# Patient Record
Sex: MALE | Race: BLACK OR AFRICAN AMERICAN | NOT HISPANIC OR LATINO | Employment: OTHER | URBAN - METROPOLITAN AREA
[De-identification: names, ages, dates, MRNs, and addresses within clinical notes are randomized per-mention and may not be internally consistent; named-entity substitution may affect disease eponyms.]

---

## 2023-06-02 ENCOUNTER — LAB (OUTPATIENT)
Dept: LAB | Facility: LAB | Age: 78
End: 2023-06-02
Payer: MEDICARE

## 2023-06-02 ENCOUNTER — OFFICE VISIT (OUTPATIENT)
Dept: PRIMARY CARE | Facility: CLINIC | Age: 78
End: 2023-06-02
Payer: MEDICARE

## 2023-06-02 VITALS
OXYGEN SATURATION: 98 % | HEART RATE: 57 BPM | DIASTOLIC BLOOD PRESSURE: 84 MMHG | SYSTOLIC BLOOD PRESSURE: 138 MMHG | HEIGHT: 71 IN | WEIGHT: 210.5 LBS | RESPIRATION RATE: 12 BRPM | BODY MASS INDEX: 29.47 KG/M2 | TEMPERATURE: 97.1 F

## 2023-06-02 DIAGNOSIS — Z13.89 ENCOUNTER FOR SCREENING FOR OTHER DISORDER: ICD-10-CM

## 2023-06-02 DIAGNOSIS — E66.3 OVERWEIGHT WITH BODY MASS INDEX (BMI) OF 29 TO 29.9 IN ADULT: ICD-10-CM

## 2023-06-02 DIAGNOSIS — Z12.12 SCREENING FOR COLORECTAL CANCER: ICD-10-CM

## 2023-06-02 DIAGNOSIS — Z00.00 MEDICARE ANNUAL WELLNESS VISIT, SUBSEQUENT: Primary | ICD-10-CM

## 2023-06-02 DIAGNOSIS — Z12.11 SCREENING FOR COLORECTAL CANCER: ICD-10-CM

## 2023-06-02 DIAGNOSIS — L84 CORN OF FOOT: ICD-10-CM

## 2023-06-02 DIAGNOSIS — Z23 NEED FOR DIPHTHERIA-TETANUS-PERTUSSIS (TDAP) VACCINE: ICD-10-CM

## 2023-06-02 PROBLEM — F02.80 ALZHEIMER DISEASE (MULTI): Status: ACTIVE | Noted: 2023-06-02

## 2023-06-02 PROBLEM — G47.33 OSA (OBSTRUCTIVE SLEEP APNEA): Status: ACTIVE | Noted: 2023-06-02

## 2023-06-02 PROBLEM — M25.562 ARTHRALGIA OF LEFT KNEE: Status: ACTIVE | Noted: 2023-06-02

## 2023-06-02 PROBLEM — M62.81 MUSCLE WEAKNESS (GENERALIZED): Status: ACTIVE | Noted: 2023-06-02

## 2023-06-02 PROBLEM — M54.32 BILATERAL SCIATICA: Status: ACTIVE | Noted: 2023-06-02

## 2023-06-02 PROBLEM — S86.812A: Status: ACTIVE | Noted: 2023-06-02

## 2023-06-02 PROBLEM — M19.019 DEGENERATIVE JOINT DISEASE OF SHOULDER REGION: Status: ACTIVE | Noted: 2023-06-02

## 2023-06-02 PROBLEM — D12.6 TUBULOVILLOUS ADENOMA POLYP OF COLON: Status: ACTIVE | Noted: 2023-06-02

## 2023-06-02 PROBLEM — R41.3 MEMORY IMPAIRMENT: Status: ACTIVE | Noted: 2023-06-02

## 2023-06-02 PROBLEM — R41.89 IMPAIRED COGNITION: Status: ACTIVE | Noted: 2023-06-02

## 2023-06-02 PROBLEM — M77.8 RIGHT SHOULDER TENDINITIS: Status: ACTIVE | Noted: 2023-06-02

## 2023-06-02 PROBLEM — G30.9 ALZHEIMER DISEASE (MULTI): Status: ACTIVE | Noted: 2023-06-02

## 2023-06-02 PROBLEM — E78.5 HYPERLIPEMIA: Status: ACTIVE | Noted: 2023-06-02

## 2023-06-02 PROBLEM — M25.569 KNEE PAIN: Status: ACTIVE | Noted: 2023-06-02

## 2023-06-02 PROBLEM — F17.211 CIGARETTE NICOTINE DEPENDENCE IN REMISSION: Status: ACTIVE | Noted: 2023-06-02

## 2023-06-02 PROBLEM — R26.2 DIFFICULTY WALKING: Status: ACTIVE | Noted: 2023-06-02

## 2023-06-02 PROBLEM — M54.16 ACUTE LUMBAR RADICULOPATHY: Status: ACTIVE | Noted: 2023-06-02

## 2023-06-02 PROBLEM — M20.40 HAMMER TOE: Status: ACTIVE | Noted: 2023-06-02

## 2023-06-02 PROBLEM — G47.30 APNEA, SLEEP: Status: ACTIVE | Noted: 2023-06-02

## 2023-06-02 PROBLEM — G89.29 CHRONIC RIGHT SHOULDER PAIN: Status: ACTIVE | Noted: 2023-06-02

## 2023-06-02 PROBLEM — G31.84 MCI (MILD COGNITIVE IMPAIRMENT): Status: ACTIVE | Noted: 2023-06-02

## 2023-06-02 PROBLEM — L30.9 ECZEMA: Status: ACTIVE | Noted: 2023-06-02

## 2023-06-02 PROBLEM — M20.62 TOE DEFORMITY, LEFT: Status: ACTIVE | Noted: 2023-06-02

## 2023-06-02 PROBLEM — R00.1 BRADYCARDIA: Status: ACTIVE | Noted: 2023-06-02

## 2023-06-02 PROBLEM — C61 MALIGNANT NEOPLASM OF PROSTATE (MULTI): Status: ACTIVE | Noted: 2023-06-02

## 2023-06-02 PROBLEM — S76.112D: Status: ACTIVE | Noted: 2023-06-02

## 2023-06-02 PROBLEM — M25.69 STIFF BACK: Status: ACTIVE | Noted: 2023-06-02

## 2023-06-02 PROBLEM — M47.27 OSTEOARTHRITIS OF SPINE WITH RADICULOPATHY, LUMBOSACRAL REGION: Status: ACTIVE | Noted: 2023-06-02

## 2023-06-02 PROBLEM — S86.812D PATELLAR TENDON RUPTURE, LEFT, SUBSEQUENT ENCOUNTER: Status: ACTIVE | Noted: 2023-06-02

## 2023-06-02 PROBLEM — Q74.2 TOE ANOMALY: Status: ACTIVE | Noted: 2023-06-02

## 2023-06-02 PROBLEM — M25.511 CHRONIC RIGHT SHOULDER PAIN: Status: ACTIVE | Noted: 2023-06-02

## 2023-06-02 PROBLEM — B07.9 VIRAL WART, UNSPECIFIED: Status: ACTIVE | Noted: 2023-06-02

## 2023-06-02 PROBLEM — M54.31 BILATERAL SCIATICA: Status: ACTIVE | Noted: 2023-06-02

## 2023-06-02 LAB
ALANINE AMINOTRANSFERASE (SGPT) (U/L) IN SER/PLAS: 24 U/L (ref 10–52)
ALBUMIN (G/DL) IN SER/PLAS: 4.2 G/DL (ref 3.4–5)
ALKALINE PHOSPHATASE (U/L) IN SER/PLAS: 81 U/L (ref 33–136)
ANION GAP IN SER/PLAS: 10 MMOL/L (ref 10–20)
ASPARTATE AMINOTRANSFERASE (SGOT) (U/L) IN SER/PLAS: 23 U/L (ref 9–39)
BASOPHILS (10*3/UL) IN BLOOD BY AUTOMATED COUNT: 0.05 X10E9/L (ref 0–0.1)
BASOPHILS/100 LEUKOCYTES IN BLOOD BY AUTOMATED COUNT: 1.3 % (ref 0–2)
BILIRUBIN TOTAL (MG/DL) IN SER/PLAS: 0.4 MG/DL (ref 0–1.2)
CALCIUM (MG/DL) IN SER/PLAS: 10.1 MG/DL (ref 8.6–10.6)
CARBON DIOXIDE, TOTAL (MMOL/L) IN SER/PLAS: 27 MMOL/L (ref 21–32)
CHLORIDE (MMOL/L) IN SER/PLAS: 109 MMOL/L (ref 98–107)
CHOLESTEROL (MG/DL) IN SER/PLAS: 174 MG/DL (ref 0–199)
CHOLESTEROL IN HDL (MG/DL) IN SER/PLAS: 52 MG/DL
CHOLESTEROL/HDL RATIO: 3.3
CREATININE (MG/DL) IN SER/PLAS: 1.04 MG/DL (ref 0.5–1.3)
EOSINOPHILS (10*3/UL) IN BLOOD BY AUTOMATED COUNT: 0.2 X10E9/L (ref 0–0.4)
EOSINOPHILS/100 LEUKOCYTES IN BLOOD BY AUTOMATED COUNT: 5.1 % (ref 0–6)
ERYTHROCYTE DISTRIBUTION WIDTH (RATIO) BY AUTOMATED COUNT: 12.7 % (ref 11.5–14.5)
ERYTHROCYTE MEAN CORPUSCULAR HEMOGLOBIN CONCENTRATION (G/DL) BY AUTOMATED: 32 G/DL (ref 32–36)
ERYTHROCYTE MEAN CORPUSCULAR VOLUME (FL) BY AUTOMATED COUNT: 99 FL (ref 80–100)
ERYTHROCYTES (10*6/UL) IN BLOOD BY AUTOMATED COUNT: 4.32 X10E12/L (ref 4.5–5.9)
GFR MALE: 74 ML/MIN/1.73M2
GLUCOSE (MG/DL) IN SER/PLAS: 80 MG/DL (ref 74–99)
HEMATOCRIT (%) IN BLOOD BY AUTOMATED COUNT: 42.8 % (ref 41–52)
HEMOGLOBIN (G/DL) IN BLOOD: 13.7 G/DL (ref 13.5–17.5)
IMMATURE GRANULOCYTES/100 LEUKOCYTES IN BLOOD BY AUTOMATED COUNT: 0.3 % (ref 0–0.9)
LDL: 105 MG/DL (ref 0–99)
LEUKOCYTES (10*3/UL) IN BLOOD BY AUTOMATED COUNT: 3.9 X10E9/L (ref 4.4–11.3)
LYMPHOCYTES (10*3/UL) IN BLOOD BY AUTOMATED COUNT: 1.59 X10E9/L (ref 0.8–3)
LYMPHOCYTES/100 LEUKOCYTES IN BLOOD BY AUTOMATED COUNT: 40.4 % (ref 13–44)
MONOCYTES (10*3/UL) IN BLOOD BY AUTOMATED COUNT: 0.46 X10E9/L (ref 0.05–0.8)
MONOCYTES/100 LEUKOCYTES IN BLOOD BY AUTOMATED COUNT: 11.7 % (ref 2–10)
NEUTROPHILS (10*3/UL) IN BLOOD BY AUTOMATED COUNT: 1.63 X10E9/L (ref 1.6–5.5)
NEUTROPHILS/100 LEUKOCYTES IN BLOOD BY AUTOMATED COUNT: 41.2 % (ref 40–80)
NRBC (PER 100 WBCS) BY AUTOMATED COUNT: 0 /100 WBC (ref 0–0)
PLATELETS (10*3/UL) IN BLOOD AUTOMATED COUNT: 214 X10E9/L (ref 150–450)
POTASSIUM (MMOL/L) IN SER/PLAS: 4.1 MMOL/L (ref 3.5–5.3)
PROTEIN TOTAL: 6.9 G/DL (ref 6.4–8.2)
SODIUM (MMOL/L) IN SER/PLAS: 142 MMOL/L (ref 136–145)
TRIGLYCERIDE (MG/DL) IN SER/PLAS: 83 MG/DL (ref 0–149)
UREA NITROGEN (MG/DL) IN SER/PLAS: 22 MG/DL (ref 6–23)
VLDL: 17 MG/DL (ref 0–40)

## 2023-06-02 PROCEDURE — 36415 COLL VENOUS BLD VENIPUNCTURE: CPT

## 2023-06-02 PROCEDURE — 1036F TOBACCO NON-USER: CPT | Performed by: STUDENT IN AN ORGANIZED HEALTH CARE EDUCATION/TRAINING PROGRAM

## 2023-06-02 PROCEDURE — 90715 TDAP VACCINE 7 YRS/> IM: CPT | Performed by: STUDENT IN AN ORGANIZED HEALTH CARE EDUCATION/TRAINING PROGRAM

## 2023-06-02 PROCEDURE — 90471 IMMUNIZATION ADMIN: CPT | Performed by: STUDENT IN AN ORGANIZED HEALTH CARE EDUCATION/TRAINING PROGRAM

## 2023-06-02 PROCEDURE — 80053 COMPREHEN METABOLIC PANEL: CPT

## 2023-06-02 PROCEDURE — G0444 DEPRESSION SCREEN ANNUAL: HCPCS | Performed by: STUDENT IN AN ORGANIZED HEALTH CARE EDUCATION/TRAINING PROGRAM

## 2023-06-02 PROCEDURE — 1170F FXNL STATUS ASSESSED: CPT | Performed by: STUDENT IN AN ORGANIZED HEALTH CARE EDUCATION/TRAINING PROGRAM

## 2023-06-02 PROCEDURE — 80061 LIPID PANEL: CPT

## 2023-06-02 PROCEDURE — G0439 PPPS, SUBSEQ VISIT: HCPCS | Performed by: STUDENT IN AN ORGANIZED HEALTH CARE EDUCATION/TRAINING PROGRAM

## 2023-06-02 PROCEDURE — 1159F MED LIST DOCD IN RCRD: CPT | Performed by: STUDENT IN AN ORGANIZED HEALTH CARE EDUCATION/TRAINING PROGRAM

## 2023-06-02 PROCEDURE — 99397 PER PM REEVAL EST PAT 65+ YR: CPT | Performed by: STUDENT IN AN ORGANIZED HEALTH CARE EDUCATION/TRAINING PROGRAM

## 2023-06-02 PROCEDURE — 85025 COMPLETE CBC W/AUTO DIFF WBC: CPT

## 2023-06-02 PROCEDURE — 1160F RVW MEDS BY RX/DR IN RCRD: CPT | Performed by: STUDENT IN AN ORGANIZED HEALTH CARE EDUCATION/TRAINING PROGRAM

## 2023-06-02 RX ORDER — TRIAMCINOLONE ACETONIDE 1 MG/G
OINTMENT TOPICAL 2 TIMES DAILY
COMMUNITY
Start: 2022-03-28

## 2023-06-02 RX ORDER — MEMANTINE HYDROCHLORIDE 10 MG/1
TABLET ORAL 2 TIMES DAILY
COMMUNITY
Start: 2023-05-16 | End: 2024-01-24 | Stop reason: SDUPTHER

## 2023-06-02 ASSESSMENT — PATIENT HEALTH QUESTIONNAIRE - PHQ9
1. LITTLE INTEREST OR PLEASURE IN DOING THINGS: NOT AT ALL
2. FEELING DOWN, DEPRESSED OR HOPELESS: NOT AT ALL
SUM OF ALL RESPONSES TO PHQ9 QUESTIONS 1 & 2: 0

## 2023-06-02 ASSESSMENT — ACTIVITIES OF DAILY LIVING (ADL)
DOING_HOUSEWORK: INDEPENDENT
GROCERY_SHOPPING: INDEPENDENT
TAKING_MEDICATION: INDEPENDENT
DRESSING: INDEPENDENT
MANAGING_FINANCES: INDEPENDENT
BATHING: INDEPENDENT

## 2023-06-02 ASSESSMENT — LIFESTYLE VARIABLES
HOW OFTEN DO YOU HAVE A DRINK CONTAINING ALCOHOL: NEVER
AUDIT-C TOTAL SCORE: 0
HOW MANY STANDARD DRINKS CONTAINING ALCOHOL DO YOU HAVE ON A TYPICAL DAY: PATIENT DOES NOT DRINK
HOW OFTEN DO YOU HAVE SIX OR MORE DRINKS ON ONE OCCASION: NEVER
SKIP TO QUESTIONS 9-10: 1

## 2023-06-02 NOTE — PATIENT INSTRUCTIONS
Continue with current medications.  Blood work before your next visit.  All the nonurgent lab results will be discussed with you at your next office visit.  Please arrive 15 minutes before your appointment.   Return to office in 1  year for medicare wellness  or as needed

## 2023-06-02 NOTE — PROGRESS NOTES
"Subjective   Reason for Visit: Magdiel Richardson is a very pleasant 77 y.o. male here for a Medicare Wellness visit.     Past Medical, Surgical, and Family History reviewed and updated in chart.    Reviewed all medications by prescribing practitioner or clinical pharmacist (such as prescriptions, OTCs, herbal therapies and supplements) and documented in the medical record.    HPI    Patient Care Team:  Ana Melo MD as PCP - General  Chantelle Toledo MD as PCP - [employer]vantage PCP     Review of Systems   All other systems reviewed and are negative.      Objective   Vitals:  /84   Pulse 57   Temp 36.2 °C (97.1 °F)   Resp 12   Ht 1.803 m (5' 11\")   Wt 95.5 kg (210 lb 8 oz)   SpO2 98%   BMI 29.36 kg/m²       Physical Exam  Constitutional:       General: He is not in acute distress.     Appearance: Normal appearance.   HENT:      Head: Normocephalic and atraumatic.   Eyes:      General: No scleral icterus.     Conjunctiva/sclera: Conjunctivae normal.   Cardiovascular:      Rate and Rhythm: Normal rate and regular rhythm.      Heart sounds: Normal heart sounds.   Pulmonary:      Effort: Pulmonary effort is normal.      Breath sounds: Normal breath sounds. No wheezing.   Abdominal:      General: Bowel sounds are normal. There is no distension.      Palpations: Abdomen is soft.      Tenderness: There is no abdominal tenderness.   Musculoskeletal:      Cervical back: Neck supple.      Right lower leg: No edema.      Left lower leg: No edema.   Lymphadenopathy:      Cervical: No cervical adenopathy.   Skin:     General: Skin is warm and dry.   Neurological:      General: No focal deficit present.      Mental Status: He is alert and oriented to person, place, and time.   Psychiatric:         Mood and Affect: Mood normal.         Behavior: Behavior normal.         Assessment/Plan   Problem List Items Addressed This Visit    None  Visit Diagnoses       Medicare annual wellness visit, subsequent    " -  Primary    Encounter for screening for other disorder        Overweight with body mass index (BMI) of 29 to 29.9 in adult        Relevant Orders    Comprehensive Metabolic Panel    CBC and Auto Differential    Lipid Panel    Screening for colorectal cancer        Relevant Orders    Colonoscopy    Corn of foot        Relevant Orders    Referral to Podiatry    Need for diphtheria-tetanus-pertussis (Tdap) vaccine        Relevant Orders    Tdap vaccine, age 10 years and older (BOOSTRIX) (Completed)

## 2023-10-03 DIAGNOSIS — R41.89 COGNITIVE IMPAIRMENT: Primary | ICD-10-CM

## 2023-10-06 RX ORDER — MEMANTINE HYDROCHLORIDE 5 MG/1
5 TABLET ORAL 2 TIMES DAILY
Qty: 180 TABLET | Refills: 0 | Status: SHIPPED | OUTPATIENT
Start: 2023-10-06 | End: 2024-01-18 | Stop reason: SDUPTHER

## 2023-11-17 DIAGNOSIS — Z12.11 COLON CANCER SCREENING: ICD-10-CM

## 2023-11-17 RX ORDER — POLYETHYLENE GLYCOL 3350, SODIUM SULFATE ANHYDROUS, SODIUM BICARBONATE, SODIUM CHLORIDE, POTASSIUM CHLORIDE 236; 22.74; 6.74; 5.86; 2.97 G/4L; G/4L; G/4L; G/4L; G/4L
4000 POWDER, FOR SOLUTION ORAL ONCE
Qty: 4000 ML | Refills: 0 | Status: SHIPPED | OUTPATIENT
Start: 2023-11-17 | End: 2023-11-17

## 2023-12-22 ENCOUNTER — TELEPHONE (OUTPATIENT)
Dept: PRIMARY CARE | Facility: CLINIC | Age: 78
End: 2023-12-22

## 2023-12-22 ENCOUNTER — OFFICE VISIT (OUTPATIENT)
Dept: GASTROENTEROLOGY | Facility: EXTERNAL LOCATION | Age: 78
End: 2023-12-22
Payer: MEDICARE

## 2023-12-22 DIAGNOSIS — M54.31 BILATERAL SCIATICA: Primary | ICD-10-CM

## 2023-12-22 DIAGNOSIS — Z86.010 PERSONAL HISTORY OF COLONIC POLYPS: Primary | ICD-10-CM

## 2023-12-22 DIAGNOSIS — Z12.12 SCREENING FOR COLORECTAL CANCER: ICD-10-CM

## 2023-12-22 DIAGNOSIS — K64.0 FIRST DEGREE HEMORRHOIDS: ICD-10-CM

## 2023-12-22 DIAGNOSIS — Q43.8 OTHER SPECIFIED CONGENITAL MALFORMATIONS OF INTESTINE: ICD-10-CM

## 2023-12-22 DIAGNOSIS — Z12.11 SCREENING FOR COLORECTAL CANCER: ICD-10-CM

## 2023-12-22 DIAGNOSIS — M54.32 BILATERAL SCIATICA: Primary | ICD-10-CM

## 2023-12-22 PROCEDURE — 1036F TOBACCO NON-USER: CPT | Performed by: INTERNAL MEDICINE

## 2023-12-22 PROCEDURE — 1160F RVW MEDS BY RX/DR IN RCRD: CPT | Performed by: INTERNAL MEDICINE

## 2023-12-22 PROCEDURE — 1159F MED LIST DOCD IN RCRD: CPT | Performed by: INTERNAL MEDICINE

## 2023-12-22 PROCEDURE — 1126F AMNT PAIN NOTED NONE PRSNT: CPT | Performed by: INTERNAL MEDICINE

## 2023-12-22 PROCEDURE — G0105 COLORECTAL SCRN; HI RISK IND: HCPCS | Performed by: INTERNAL MEDICINE

## 2023-12-22 NOTE — TELEPHONE ENCOUNTER
Patient came into office to see if he can request to have his disability card Renewed because it will be expiring soon.

## 2024-01-18 DIAGNOSIS — R41.89 COGNITIVE IMPAIRMENT: ICD-10-CM

## 2024-01-24 DIAGNOSIS — Z82.0 FAMILY HISTORY OF ALZHEIMER'S DISEASE: Primary | ICD-10-CM

## 2024-01-24 RX ORDER — MEMANTINE HYDROCHLORIDE 5 MG/1
5 TABLET ORAL 2 TIMES DAILY
Qty: 180 TABLET | Refills: 0 | Status: SHIPPED | OUTPATIENT
Start: 2024-01-24 | End: 2024-01-24 | Stop reason: DRUGHIGH

## 2024-01-24 RX ORDER — MEMANTINE HYDROCHLORIDE 10 MG/1
10 TABLET ORAL 2 TIMES DAILY
Qty: 180 TABLET | Refills: 2 | Status: SHIPPED | OUTPATIENT
Start: 2024-01-24 | End: 2024-02-23 | Stop reason: SDUPTHER

## 2024-02-23 DIAGNOSIS — Z82.0 FAMILY HISTORY OF ALZHEIMER'S DISEASE: ICD-10-CM

## 2024-02-26 ENCOUNTER — TELEPHONE (OUTPATIENT)
Dept: NEUROLOGY | Facility: CLINIC | Age: 79
End: 2024-02-26
Payer: MEDICARE

## 2024-02-26 DIAGNOSIS — Z82.0 FAMILY HISTORY OF ALZHEIMER'S DISEASE: ICD-10-CM

## 2024-02-26 RX ORDER — MEMANTINE HYDROCHLORIDE 10 MG/1
10 TABLET ORAL 2 TIMES DAILY
Qty: 180 TABLET | Refills: 2 | Status: SHIPPED | OUTPATIENT
Start: 2024-02-26 | End: 2024-02-29 | Stop reason: SDUPTHER

## 2024-03-04 RX ORDER — MEMANTINE HYDROCHLORIDE 10 MG/1
10 TABLET ORAL 2 TIMES DAILY
Qty: 180 TABLET | Refills: 2 | Status: SHIPPED | OUTPATIENT
Start: 2024-03-04

## 2024-03-21 ENCOUNTER — HOSPITAL ENCOUNTER (OUTPATIENT)
Dept: RADIOLOGY | Facility: CLINIC | Age: 79
Discharge: HOME | End: 2024-03-21
Payer: MEDICARE

## 2024-03-21 ENCOUNTER — TELEMEDICINE (OUTPATIENT)
Dept: PRIMARY CARE | Facility: CLINIC | Age: 79
End: 2024-03-21
Payer: MEDICARE

## 2024-03-21 DIAGNOSIS — J06.9 VIRAL UPPER RESPIRATORY TRACT INFECTION: ICD-10-CM

## 2024-03-21 DIAGNOSIS — J06.9 VIRAL UPPER RESPIRATORY TRACT INFECTION: Primary | ICD-10-CM

## 2024-03-21 DIAGNOSIS — G30.9 ALZHEIMER DISEASE (MULTI): ICD-10-CM

## 2024-03-21 DIAGNOSIS — F02.80 ALZHEIMER DISEASE (MULTI): ICD-10-CM

## 2024-03-21 PROBLEM — C61 MALIGNANT NEOPLASM OF PROSTATE (MULTI): Status: RESOLVED | Noted: 2023-06-02 | Resolved: 2024-03-21

## 2024-03-21 PROCEDURE — 71046 X-RAY EXAM CHEST 2 VIEWS: CPT

## 2024-03-21 PROCEDURE — 1036F TOBACCO NON-USER: CPT | Performed by: STUDENT IN AN ORGANIZED HEALTH CARE EDUCATION/TRAINING PROGRAM

## 2024-03-21 PROCEDURE — 1159F MED LIST DOCD IN RCRD: CPT | Performed by: STUDENT IN AN ORGANIZED HEALTH CARE EDUCATION/TRAINING PROGRAM

## 2024-03-21 PROCEDURE — 71046 X-RAY EXAM CHEST 2 VIEWS: CPT | Performed by: RADIOLOGY

## 2024-03-21 PROCEDURE — 99442 PR PHYS/QHP TELEPHONE EVALUATION 11-20 MIN: CPT | Performed by: STUDENT IN AN ORGANIZED HEALTH CARE EDUCATION/TRAINING PROGRAM

## 2024-03-21 RX ORDER — PROMETHAZINE HYDROCHLORIDE AND DEXTROMETHORPHAN HYDROBROMIDE 6.25; 15 MG/5ML; MG/5ML
5 SYRUP ORAL EVERY 4 HOURS PRN
Qty: 120 ML | Refills: 0 | Status: SHIPPED | OUTPATIENT
Start: 2024-03-21 | End: 2024-04-20

## 2024-03-21 RX ORDER — GUAIFENESIN 400 MG/1
400 TABLET ORAL EVERY 4 HOURS PRN
Qty: 30 TABLET | Refills: 2 | Status: SHIPPED | OUTPATIENT
Start: 2024-03-21

## 2024-03-21 RX ORDER — PREDNISONE 20 MG/1
20 TABLET ORAL DAILY
Qty: 5 TABLET | Refills: 0 | Status: SHIPPED | OUTPATIENT
Start: 2024-03-21 | End: 2024-03-26

## 2024-03-21 RX ORDER — AZITHROMYCIN 250 MG/1
TABLET, FILM COATED ORAL
Qty: 6 TABLET | Refills: 0 | Status: SHIPPED | OUTPATIENT
Start: 2024-03-21 | End: 2024-03-26

## 2024-03-21 ASSESSMENT — ENCOUNTER SYMPTOMS
WHEEZING: 1
COUGH: 1
RHINORRHEA: 0
SORE THROAT: 1

## 2024-03-21 NOTE — PROGRESS NOTES
An interactive audio  telecommunications system which permits real-time communications between the patient (at the originating site) and provider (at the distant site) was utilized to provide this telehealth service.  Verbal consent was requested and obtained from the pt for a telehealth visit.    Subjective   Patient ID: Magdiel Richardson is a pleasant 78 y.o. male who presents for URI (Started 3/11. Was seen at  on 3/14).  URI   This is a new problem. The current episode started in the past 7 days. The problem has been unchanged. There has been no fever. Associated symptoms include congestion, coughing, a sore throat and wheezing. Pertinent negatives include no rhinorrhea or sneezing. Vomiting: night time.He has tried NSAIDs (nyquel) for the symptoms.   Reports that he has been feeling his breathing is slightly more labored.  Denies any chest pain or palpitations.  Reports that when he was seen at urgent care he was tested for COVID which was negative.  Reports despite taking NyQuil and ibuprofen his symptoms have not resolved and he is still coughing up, productive of whitish phlegm.    Review of Systems   HENT:  Positive for congestion and sore throat. Negative for rhinorrhea and sneezing.    Respiratory:  Positive for cough and wheezing.    Gastrointestinal:  Vomiting: night time.       There were no vitals taken for this visit.         Assessment/Plan   Problem List Items Addressed This Visit       Alzheimer disease (CMS/[facility])     Continue to follow-up with neurology          Other Visit Diagnoses       Viral upper respiratory tract infection    -  Primary    Relevant Medications    azithromycin (Zithromax) 250 mg tablet    predniSONE (Deltasone) 20 mg tablet    promethazine-DM (Phenergan-DM) 6.25-15 mg/5 mL syrup    guaiFENesin (Mucus Relief) 400 mg tablet    Other Relevant Orders    XR chest 2 views

## 2024-03-25 ENCOUNTER — TELEPHONE (OUTPATIENT)
Dept: PRIMARY CARE | Facility: CLINIC | Age: 79
End: 2024-03-25

## 2024-03-25 NOTE — TELEPHONE ENCOUNTER
Spoke with patient Mon 3/25/24, patient understood pcp message . Patient stated he felt a lot better today, better than other days.

## 2024-07-29 ENCOUNTER — APPOINTMENT (OUTPATIENT)
Dept: PRIMARY CARE | Facility: CLINIC | Age: 79
End: 2024-07-29
Payer: MEDICARE

## 2024-07-29 VITALS
TEMPERATURE: 97.6 F | HEART RATE: 51 BPM | BODY MASS INDEX: 28.19 KG/M2 | WEIGHT: 201.4 LBS | RESPIRATION RATE: 20 BRPM | HEIGHT: 71 IN | OXYGEN SATURATION: 97 %

## 2024-07-29 DIAGNOSIS — E55.9 VITAMIN D DEFICIENCY: ICD-10-CM

## 2024-07-29 DIAGNOSIS — Z13.29 THYROID DISORDER SCREENING: ICD-10-CM

## 2024-07-29 DIAGNOSIS — E78.2 MIXED HYPERLIPIDEMIA: ICD-10-CM

## 2024-07-29 DIAGNOSIS — M54.32 BILATERAL SCIATICA: ICD-10-CM

## 2024-07-29 DIAGNOSIS — R39.15 URGENCY OF URINATION: ICD-10-CM

## 2024-07-29 DIAGNOSIS — Z90.79 H/O PROSTATECTOMY: ICD-10-CM

## 2024-07-29 DIAGNOSIS — M54.31 BILATERAL SCIATICA: ICD-10-CM

## 2024-07-29 DIAGNOSIS — R73.09 ABNORMAL GLUCOSE: Primary | ICD-10-CM

## 2024-07-29 DIAGNOSIS — Z11.59 NEED FOR HEPATITIS C SCREENING TEST: ICD-10-CM

## 2024-07-29 PROCEDURE — 99213 OFFICE O/P EST LOW 20 MIN: CPT | Performed by: INTERNAL MEDICINE

## 2024-07-29 PROCEDURE — 1036F TOBACCO NON-USER: CPT | Performed by: INTERNAL MEDICINE

## 2024-07-29 PROCEDURE — 1160F RVW MEDS BY RX/DR IN RCRD: CPT | Performed by: INTERNAL MEDICINE

## 2024-07-29 PROCEDURE — 96372 THER/PROPH/DIAG INJ SC/IM: CPT | Performed by: INTERNAL MEDICINE

## 2024-07-29 PROCEDURE — 1159F MED LIST DOCD IN RCRD: CPT | Performed by: INTERNAL MEDICINE

## 2024-07-29 RX ORDER — KETOROLAC TROMETHAMINE 30 MG/ML
60 INJECTION, SOLUTION INTRAMUSCULAR; INTRAVENOUS ONCE
Status: COMPLETED | OUTPATIENT
Start: 2024-07-29 | End: 2024-07-29

## 2024-07-29 RX ADMIN — KETOROLAC TROMETHAMINE 60 MG: 30 INJECTION, SOLUTION INTRAMUSCULAR; INTRAVENOUS at 15:53

## 2024-07-29 ASSESSMENT — PATIENT HEALTH QUESTIONNAIRE - PHQ9
SUM OF ALL RESPONSES TO PHQ9 QUESTIONS 1 AND 2: 0
2. FEELING DOWN, DEPRESSED OR HOPELESS: NOT AT ALL
1. LITTLE INTEREST OR PLEASURE IN DOING THINGS: NOT AT ALL

## 2024-07-30 ENCOUNTER — APPOINTMENT (OUTPATIENT)
Dept: PRIMARY CARE | Facility: CLINIC | Age: 79
End: 2024-07-30
Payer: MEDICARE

## 2024-07-31 ENCOUNTER — LAB (OUTPATIENT)
Dept: LAB | Facility: LAB | Age: 79
End: 2024-07-31
Payer: MEDICARE

## 2024-07-31 DIAGNOSIS — Z13.29 THYROID DISORDER SCREENING: ICD-10-CM

## 2024-07-31 DIAGNOSIS — E78.2 MIXED HYPERLIPIDEMIA: ICD-10-CM

## 2024-07-31 DIAGNOSIS — R73.09 ABNORMAL GLUCOSE: ICD-10-CM

## 2024-07-31 DIAGNOSIS — R39.15 URGENCY OF URINATION: ICD-10-CM

## 2024-07-31 DIAGNOSIS — Z90.79 H/O PROSTATECTOMY: ICD-10-CM

## 2024-07-31 DIAGNOSIS — Z11.59 NEED FOR HEPATITIS C SCREENING TEST: ICD-10-CM

## 2024-07-31 DIAGNOSIS — E55.9 VITAMIN D DEFICIENCY: ICD-10-CM

## 2024-07-31 LAB
25(OH)D3 SERPL-MCNC: 13 NG/ML (ref 30–100)
CHOLEST SERPL-MCNC: 221 MG/DL (ref 0–199)
CHOLESTEROL/HDL RATIO: 4.1
EST. AVERAGE GLUCOSE BLD GHB EST-MCNC: 108 MG/DL
HBA1C MFR BLD: 5.4 %
HCV AB SER QL: NONREACTIVE
HDLC SERPL-MCNC: 53.6 MG/DL
LDLC SERPL CALC-MCNC: 128 MG/DL
NON HDL CHOLESTEROL: 167 MG/DL (ref 0–149)
TRIGL SERPL-MCNC: 195 MG/DL (ref 0–149)
TSH SERPL-ACNC: 1.15 MIU/L (ref 0.44–3.98)
VLDL: 39 MG/DL (ref 0–40)

## 2024-07-31 PROCEDURE — 84154 ASSAY OF PSA FREE: CPT

## 2024-07-31 PROCEDURE — 80061 LIPID PANEL: CPT

## 2024-07-31 PROCEDURE — 82306 VITAMIN D 25 HYDROXY: CPT

## 2024-07-31 PROCEDURE — 84153 ASSAY OF PSA TOTAL: CPT

## 2024-07-31 PROCEDURE — 36415 COLL VENOUS BLD VENIPUNCTURE: CPT

## 2024-08-01 ENCOUNTER — OFFICE VISIT (OUTPATIENT)
Dept: PAIN MEDICINE | Facility: CLINIC | Age: 79
End: 2024-08-01
Payer: MEDICARE

## 2024-08-01 VITALS
SYSTOLIC BLOOD PRESSURE: 153 MMHG | RESPIRATION RATE: 18 BRPM | HEIGHT: 71 IN | HEART RATE: 60 BPM | BODY MASS INDEX: 28.14 KG/M2 | WEIGHT: 201 LBS | DIASTOLIC BLOOD PRESSURE: 90 MMHG

## 2024-08-01 DIAGNOSIS — M54.16 LUMBAR RADICULOPATHY: Primary | ICD-10-CM

## 2024-08-01 PROCEDURE — 1036F TOBACCO NON-USER: CPT | Performed by: STUDENT IN AN ORGANIZED HEALTH CARE EDUCATION/TRAINING PROGRAM

## 2024-08-01 PROCEDURE — 99204 OFFICE O/P NEW MOD 45 MIN: CPT | Performed by: STUDENT IN AN ORGANIZED HEALTH CARE EDUCATION/TRAINING PROGRAM

## 2024-08-01 PROCEDURE — 99214 OFFICE O/P EST MOD 30 MIN: CPT | Performed by: STUDENT IN AN ORGANIZED HEALTH CARE EDUCATION/TRAINING PROGRAM

## 2024-08-01 PROCEDURE — 1159F MED LIST DOCD IN RCRD: CPT | Performed by: STUDENT IN AN ORGANIZED HEALTH CARE EDUCATION/TRAINING PROGRAM

## 2024-08-01 PROCEDURE — 1125F AMNT PAIN NOTED PAIN PRSNT: CPT | Performed by: STUDENT IN AN ORGANIZED HEALTH CARE EDUCATION/TRAINING PROGRAM

## 2024-08-01 PROCEDURE — 1160F RVW MEDS BY RX/DR IN RCRD: CPT | Performed by: STUDENT IN AN ORGANIZED HEALTH CARE EDUCATION/TRAINING PROGRAM

## 2024-08-01 RX ORDER — GABAPENTIN 300 MG/1
300 CAPSULE ORAL 3 TIMES DAILY
Qty: 90 CAPSULE | Refills: 1 | Status: SHIPPED | OUTPATIENT
Start: 2024-08-01 | End: 2024-09-30

## 2024-08-01 RX ORDER — DICLOFENAC SODIUM 75 MG/1
75 TABLET, DELAYED RELEASE ORAL 2 TIMES DAILY
Qty: 60 TABLET | Refills: 1 | Status: SHIPPED | OUTPATIENT
Start: 2024-08-01 | End: 2024-09-30

## 2024-08-01 ASSESSMENT — PAIN SCALES - GENERAL
PAINLEVEL_OUTOF10: 9
PAINLEVEL: 9

## 2024-08-01 ASSESSMENT — LIFESTYLE VARIABLES: TOTAL SCORE: 0

## 2024-08-01 ASSESSMENT — PATIENT HEALTH QUESTIONNAIRE - PHQ9
2. FEELING DOWN, DEPRESSED OR HOPELESS: NOT AT ALL
SUM OF ALL RESPONSES TO PHQ9 QUESTIONS 1 AND 2: 0
1. LITTLE INTEREST OR PLEASURE IN DOING THINGS: NOT AT ALL

## 2024-08-01 ASSESSMENT — PAIN - FUNCTIONAL ASSESSMENT: PAIN_FUNCTIONAL_ASSESSMENT: 0-10

## 2024-08-01 ASSESSMENT — PAIN DESCRIPTION - DESCRIPTORS: DESCRIPTORS: STABBING;ACHING

## 2024-08-01 NOTE — PROGRESS NOTES
"[facility]  New Patient Office Visit Note 2024    Patient Information: Magdiel Richardson, MRN: [id number], : 1945   Primary Care/Referring Physician: Flako Miguel MD, [id number] [address] / [facility] [id number]     Chief Complaint: Left low back and leg  History of Pain: Mr. Magdiel Richardson is a 79 y.o. male with a PMHx of HLD, hx of prostate cancer who presents for left low back and leg pain.    He reports onset of pain around 1 week ago with no obvious inciting event. He states that his back started \"tightening up\" at night, which gradually worsened until he decided to go to the ED and have it evaluated. This has progressed to an intermittent stabbing pain which starts in the left low back with radiation to his left lateral thigh and the right medial calf. He feels some tingling down in his left calf and also in his bilateral feet. He denies any weakness. He has difficulty identifying trigger factors. He had a lumbar spine CT in the ED showing both central and foraminal stenosis at multiple levels, worse at L4/5    Current Pain Medications: None  Previously Tried Pain Medications: Medrol dosepak - minimal relief, Methocarbamol - minimal relief, IM Toradol - no benefit    Relevant Surgeries: Hx of bilateral TKA. Denies lumbar spine or hip surgeries  Injections: Denies lumbar spine injections   Physical/Occupational Therapy: No recent PT    Medications:   Current Outpatient Medications   Medication Instructions    diclofenac (VOLTAREN) 75 mg, oral, 2 times daily, Do not crush, chew, or split.    gabapentin (NEURONTIN) 300 mg, oral, 3 times daily    guaiFENesin (MUCUS RELIEF) 400 mg, oral, Every 4 hours PRN    memantine (NAMENDA) 10 mg, oral, 2 times daily    NON FORMULARY Disability Placard: 3/1/2019-3/1/2024    triamcinolone (Kenalog) 0.1 % ointment 2 times daily      Allergies: No Known Allergies    Past Medical & Surgical History:  History reviewed. No pertinent past medical history. " "  Past Surgical History:   Procedure Laterality Date    OTHER SURGICAL HISTORY  08/19/2016    History Of Prior Surgery    PROSTATE SURGERY  08/19/2016    Prostate Surgery       No family history on file.  Social History     Socioeconomic History    Marital status:      Spouse name: Not on file    Number of children: Not on file    Years of education: Not on file    Highest education level: Not on file   Occupational History    Not on file   Tobacco Use    Smoking status: Never    Smokeless tobacco: Never   Substance and Sexual Activity    Alcohol use: Never    Drug use: Never    Sexual activity: Not on file   Other Topics Concern    Not on file   Social History Narrative    Not on file     Social Determinants of Health     Financial Resource Strain: Not on file   Food Insecurity: Not on file   Transportation Needs: Not on file   Physical Activity: Not on file   Stress: Not on file   Social Connections: Not on file   Intimate Partner Violence: Not on file   Housing Stability: Not on file       Problems, Past medical history, past surgical history, Medications, allergies, social and family history reviewed and as per the electronic medical record from today's encounter    Review of Systems:  CONST: No fever, chills, fatigue, weight changes  EYES: No loss of vision  ENT: No hearing loss, tinnitus  CV: No chest pain, palpitations  RESP: No dyspnea, shortness of breath, cough  GI: No stool incontinence, nausea, vomiting  : No urinary incontinence  MSK: No joint swelling  SKIN: No rash, no hives  NEURO: No headache, dizziness  PSYCH: No anxiety, depression  HEM/LYMPH: No easy bruising or bleeding  All other systems reviewed are negative     Physical Exam:  Vitals: /90   Pulse 60   Resp 18   Ht 1.803 m (5' 11\")   Wt 91.2 kg (201 lb)   BMI 28.03 kg/m²   General: No apparent distress. Alert, appropriate, oriented x 3. Mood and affect normal. Speaking in full sentences.  HENT: Normocephalic, atraumatic. " "Hearing intact.  Eyes: Extraocular movements grossly intact. Pupils equal and round.   Neck: Supple, trachea midline.  Lungs: Symmetric respiratory excursion on visual exam, nonlabored breathing.  Extremities: No clubbing, cyanosis, or edema noted in arms or legs.  Skin: No rashes, lesions, alopecia noted on back or extremities.   Back:  Reports pain with extension and lumbar facet loading maneuvers bilaterally. No spasm noted over musculature. No misalignment or asymmetry noted.  Neuro: Alert and appropriate. Motor strength 5/5 throughout bilateral lower extremities. Sensory intact to light touch bilateral lower extremities. Gait within normal limits. Bulk and tone within normal limits.    Laboratory Data:  The following laboratory data were reviewed during this visit:   Lab Results   Component Value Date    WBC 3.9 (L) 06/02/2023    RBC 4.32 (L) 06/02/2023    HGB 13.7 06/02/2023    HCT 42.8 06/02/2023     06/02/2023      No results found for: \"INR\"  Lab Results   Component Value Date    CREATININE 1.04 06/02/2023    HGBA1C 5.4 07/31/2024       Imaging:  The following imaging impressions were reviewed by me during this visit:    -7/25/24 lumbar spine CT shows no evidence of compression fracture. Multilevel degenerative changes of the lumbar spine with severe disc height loss at L2-3, L3-L4, L4-L5, and L5-S1.  Multilevel lumbar central canal stenosis which is severe at L4-L5.  Multilevel mild to moderate osseous neural foraminal narrowing.  Severe left osseous neural foraminal narrowing at L3-L4 and severe bilateral osseous neural foraminal narrowing at L4-L5.  Multilevel facet degenerative changes most significant at L4-L5      I also personally reviewed the images from the above studies myself. These images and my interpretation of them contributed to the management and decision making of the patient's medical plan.    ASSESSMENT:  Mr. Magdiel Richardson is a 79 y.o. male with left low back and leg pain that is " consistent with:    1. Lumbar radiculopathy        PLAN:    Diagnostics:   - I reviewed the report of his recent lumbar spine CT (see above for details). I will request the images for me to personally review    Physical Therapy and Rehabilitation:     - I believe his pain is currently too severe to meaningfully participate in PT. May consider referral in the future    Psychologically:  - No needs at this time    Medications  - Recommend PO Diclofenac 75 mg BID. Education on this medication provided today. Side effects reviewed  - Recommend Gabapentin 300 mg TID. Education on this medication provided today. Side effects reviewed    Duration  - 1 week    Interventions:  - I suspect his pain is secondary to lumbar radiculopathy. Given how severe and debilitating his pain is I would consider interventional treatment (interlaminar vs. Transforaminal RADHA) but would like to review his CT personally first.    ADDENDUM 8/23/24: I received and reviewed his CT of the lumbar spine from 7/25/2024.  This shows severe canal stenosis at L4/5 in the setting of anterolisthesis of L4 on L5, with moderate to severe foraminal stenosis at L3/4 through L5/S1, worse on the left.  He continues have severe, debilitating left leg pain that has not improved with conservative care thus I recommend a left paramedian interlaminar lumbar RADHA at L5/S1 utilizing live fluoroscopy and local anesthesia. Risks, benefits, alternatives discussed. He would like to proceed        Sincerely,  Bucky Allen MD  [facility] [place]

## 2024-08-02 LAB
PSA FREE MFR SERPL: NORMAL %
PSA FREE SERPL-MCNC: <0.1 NG/ML
PSA SERPL IA-MCNC: <0.1 NG/ML (ref 0–4)

## 2024-08-04 ASSESSMENT — ENCOUNTER SYMPTOMS
DIFFICULTY URINATING: 0
SORE THROAT: 0
CONSTIPATION: 0
PALPITATIONS: 0
SINUS PAIN: 0
VOICE CHANGE: 0
ARTHRALGIAS: 0
POLYPHAGIA: 0
WOUND: 0
CHOKING: 0
EYE DISCHARGE: 0
SLEEP DISTURBANCE: 0
CHEST TIGHTNESS: 0
CONFUSION: 0
ACTIVITY CHANGE: 0
VOMITING: 0
DYSURIA: 0
DIARRHEA: 0
NAUSEA: 0
STRIDOR: 0
DIZZINESS: 0
DYSPHORIC MOOD: 0
SPEECH DIFFICULTY: 0
SINUS PRESSURE: 0
SEIZURES: 0
PHOTOPHOBIA: 0
APPETITE CHANGE: 0
POLYDIPSIA: 0
FLANK PAIN: 0
FACIAL ASYMMETRY: 0
EYE REDNESS: 0
SHORTNESS OF BREATH: 0
BACK PAIN: 0
BLOOD IN STOOL: 0
FEVER: 0
ABDOMINAL PAIN: 0
NECK STIFFNESS: 0
COUGH: 0
HEMATURIA: 0
MYALGIAS: 0
ABDOMINAL DISTENTION: 0
COLOR CHANGE: 0
BRUISES/BLEEDS EASILY: 0
FATIGUE: 0
NERVOUS/ANXIOUS: 0
HEADACHES: 0
FREQUENCY: 0
TROUBLE SWALLOWING: 0
HALLUCINATIONS: 0
WEAKNESS: 0
RHINORRHEA: 0
NUMBNESS: 0
WHEEZING: 0

## 2024-08-04 NOTE — PROGRESS NOTES
"Subjective   Patient ID: Magdiel Richardson is a 79 y.o. male who presents for New Patient Visit.    HPI   Patient presented to the office for new patient visit to establish care. He need labs.    Review of Systems   Constitutional:  Negative for activity change, appetite change, fatigue and fever.   HENT:  Negative for congestion, dental problem, ear pain, hearing loss, rhinorrhea, sinus pressure, sinus pain, sore throat, trouble swallowing and voice change.    Eyes:  Negative for photophobia, discharge, redness and visual disturbance.   Respiratory:  Negative for cough, choking, chest tightness, shortness of breath, wheezing and stridor.    Cardiovascular:  Negative for chest pain, palpitations and leg swelling.   Gastrointestinal:  Negative for abdominal distention, abdominal pain, blood in stool, constipation, diarrhea, nausea and vomiting.   Endocrine: Negative for polydipsia, polyphagia and polyuria.   Genitourinary:  Negative for difficulty urinating, dysuria, flank pain, frequency, hematuria and urgency.   Musculoskeletal:  Negative for arthralgias, back pain, gait problem, myalgias and neck stiffness.   Skin:  Negative for color change, rash and wound.   Allergic/Immunologic: Negative for immunocompromised state.   Neurological:  Negative for dizziness, seizures, syncope, facial asymmetry, speech difficulty, weakness, numbness and headaches.   Hematological:  Does not bruise/bleed easily.   Psychiatric/Behavioral:  Negative for behavioral problems, confusion, dysphoric mood, hallucinations, sleep disturbance and suicidal ideas. The patient is not nervous/anxious.      Objective   Pulse 51   Temp 36.4 °C (97.6 °F) (Temporal)   Resp 20   Ht 1.803 m (5' 11\")   Wt 91.4 kg (201 lb 6.4 oz)   SpO2 97%   BMI 28.09 kg/m²     Physical Exam  Constitutional:       General: He is not in acute distress.     Appearance: Normal appearance. He is not ill-appearing or toxic-appearing.   HENT:      Head: Normocephalic and " atraumatic.      Nose: Nose normal.      Mouth/Throat:      Mouth: Mucous membranes are moist.   Eyes:      General: No scleral icterus.     Conjunctiva/sclera: Conjunctivae normal.   Cardiovascular:      Rate and Rhythm: Normal rate and regular rhythm.      Pulses: Normal pulses.      Heart sounds: Normal heart sounds. No murmur heard.  Pulmonary:      Effort: Pulmonary effort is normal. No respiratory distress.      Breath sounds: Normal breath sounds. No stridor. No wheezing, rhonchi or rales.   Abdominal:      General: Abdomen is flat. Bowel sounds are normal.      Palpations: Abdomen is soft.      Tenderness: There is no abdominal tenderness. There is no right CVA tenderness or left CVA tenderness.   Musculoskeletal:         General: No swelling or deformity. Normal range of motion.      Cervical back: Normal range of motion and neck supple. No rigidity or tenderness.      Right lower leg: No edema.      Left lower leg: No edema.   Lymphadenopathy:      Cervical: No cervical adenopathy.   Skin:     General: Skin is warm.      Coloration: Skin is not jaundiced.      Findings: No erythema.   Neurological:      General: No focal deficit present.      Mental Status: He is alert and oriented to person, place, and time.      Cranial Nerves: No cranial nerve deficit.      Coordination: Coordination normal.      Gait: Gait normal.   Psychiatric:         Mood and Affect: Mood normal.         Behavior: Behavior normal.         Thought Content: Thought content normal.         Judgment: Judgment normal.       Assessment/Plan   Problem List Items Addressed This Visit       Bilateral sciatica    Hyperlipemia    Relevant Orders    Lipid Panel (Completed)     Other Visit Diagnoses       Abnormal glucose    -  Primary    Relevant Orders    Hemoglobin A1C (Completed)    H/O prostatectomy        Relevant Orders    PSA, Total and Free (Completed)    Vitamin D deficiency        Relevant Orders    Vitamin D 25-Hydroxy,Total (for  eval of Vitamin D levels) (Completed)    Thyroid disorder screening        Relevant Orders    TSH with reflex to Free T4 if abnormal (Completed)    Need for hepatitis C screening test        Relevant Orders    Hepatitis C Antibody (Completed)    Urgency of urination        Relevant Orders    PSA, Total and Free (Completed)

## 2024-08-05 ENCOUNTER — APPOINTMENT (OUTPATIENT)
Dept: RADIOLOGY | Facility: HOSPITAL | Age: 79
End: 2024-08-05
Payer: MEDICARE

## 2024-08-05 ENCOUNTER — APPOINTMENT (OUTPATIENT)
Dept: CARDIOLOGY | Facility: HOSPITAL | Age: 79
End: 2024-08-05
Payer: MEDICARE

## 2024-08-05 ENCOUNTER — HOSPITAL ENCOUNTER (OUTPATIENT)
Facility: HOSPITAL | Age: 79
Setting detail: OBSERVATION
Discharge: HOME | End: 2024-08-06
Attending: EMERGENCY MEDICINE | Admitting: INTERNAL MEDICINE
Payer: MEDICARE

## 2024-08-05 DIAGNOSIS — M54.16 LUMBAR RADICULOPATHY: ICD-10-CM

## 2024-08-05 DIAGNOSIS — M54.32 SCIATICA OF LEFT SIDE: Primary | ICD-10-CM

## 2024-08-05 DIAGNOSIS — I26.99 OTHER ACUTE PULMONARY EMBOLISM, UNSPECIFIED WHETHER ACUTE COR PULMONALE PRESENT (MULTI): ICD-10-CM

## 2024-08-05 LAB
ALBUMIN SERPL BCP-MCNC: 4.6 G/DL (ref 3.4–5)
ALP SERPL-CCNC: 77 U/L (ref 33–136)
ALT SERPL W P-5'-P-CCNC: 25 U/L (ref 10–52)
ANION GAP SERPL CALC-SCNC: 12 MMOL/L (ref 10–20)
APTT PPP: 36 SECONDS (ref 27–38)
AST SERPL W P-5'-P-CCNC: 16 U/L (ref 9–39)
BASOPHILS # BLD AUTO: 0.04 X10*3/UL (ref 0–0.1)
BASOPHILS NFR BLD AUTO: 0.7 %
BILIRUB SERPL-MCNC: 0.8 MG/DL (ref 0–1.2)
BUN SERPL-MCNC: 14 MG/DL (ref 6–23)
CALCIUM SERPL-MCNC: 10.4 MG/DL (ref 8.6–10.3)
CARDIAC TROPONIN I PNL SERPL HS: 15 NG/L (ref 0–20)
CHLORIDE SERPL-SCNC: 102 MMOL/L (ref 98–107)
CO2 SERPL-SCNC: 26 MMOL/L (ref 21–32)
CREAT SERPL-MCNC: 0.9 MG/DL (ref 0.5–1.3)
EGFRCR SERPLBLD CKD-EPI 2021: 88 ML/MIN/1.73M*2
EOSINOPHIL # BLD AUTO: 0.07 X10*3/UL (ref 0–0.4)
EOSINOPHIL NFR BLD AUTO: 1.3 %
ERYTHROCYTE [DISTWIDTH] IN BLOOD BY AUTOMATED COUNT: 12.4 % (ref 11.5–14.5)
GLUCOSE SERPL-MCNC: 94 MG/DL (ref 74–99)
HCT VFR BLD AUTO: 46.2 % (ref 41–52)
HGB BLD-MCNC: 15.8 G/DL (ref 13.5–17.5)
IMM GRANULOCYTES # BLD AUTO: 0.01 X10*3/UL (ref 0–0.5)
IMM GRANULOCYTES NFR BLD AUTO: 0.2 % (ref 0–0.9)
INR PPP: 1.1 (ref 0.9–1.1)
LACTATE BLDV-SCNC: 1.2 MMOL/L (ref 0.4–2)
LIPASE SERPL-CCNC: 11 U/L (ref 9–82)
LYMPHOCYTES # BLD AUTO: 1.97 X10*3/UL (ref 0.8–3)
LYMPHOCYTES NFR BLD AUTO: 36.7 %
MCH RBC QN AUTO: 32.5 PG (ref 26–34)
MCHC RBC AUTO-ENTMCNC: 34.2 G/DL (ref 32–36)
MCV RBC AUTO: 95 FL (ref 80–100)
MONOCYTES # BLD AUTO: 0.39 X10*3/UL (ref 0.05–0.8)
MONOCYTES NFR BLD AUTO: 7.3 %
NEUTROPHILS # BLD AUTO: 2.89 X10*3/UL (ref 1.6–5.5)
NEUTROPHILS NFR BLD AUTO: 53.8 %
NRBC BLD-RTO: 0 /100 WBCS (ref 0–0)
PLATELET # BLD AUTO: 233 X10*3/UL (ref 150–450)
POTASSIUM SERPL-SCNC: 4.2 MMOL/L (ref 3.5–5.3)
PROT SERPL-MCNC: 7.8 G/DL (ref 6.4–8.2)
PROTHROMBIN TIME: 12.7 SECONDS (ref 9.8–12.8)
RBC # BLD AUTO: 4.86 X10*6/UL (ref 4.5–5.9)
SODIUM SERPL-SCNC: 136 MMOL/L (ref 136–145)
WBC # BLD AUTO: 5.4 X10*3/UL (ref 4.4–11.3)

## 2024-08-05 PROCEDURE — 2500000001 HC RX 250 WO HCPCS SELF ADMINISTERED DRUGS (ALT 637 FOR MEDICARE OP): Performed by: EMERGENCY MEDICINE

## 2024-08-05 PROCEDURE — 84484 ASSAY OF TROPONIN QUANT: CPT | Performed by: EMERGENCY MEDICINE

## 2024-08-05 PROCEDURE — 96376 TX/PRO/DX INJ SAME DRUG ADON: CPT

## 2024-08-05 PROCEDURE — 85025 COMPLETE CBC W/AUTO DIFF WBC: CPT | Performed by: EMERGENCY MEDICINE

## 2024-08-05 PROCEDURE — 80053 COMPREHEN METABOLIC PANEL: CPT | Performed by: EMERGENCY MEDICINE

## 2024-08-05 PROCEDURE — 99285 EMERGENCY DEPT VISIT HI MDM: CPT | Mod: 25

## 2024-08-05 PROCEDURE — 74177 CT ABD & PELVIS W/CONTRAST: CPT

## 2024-08-05 PROCEDURE — 85730 THROMBOPLASTIN TIME PARTIAL: CPT | Performed by: EMERGENCY MEDICINE

## 2024-08-05 PROCEDURE — 85610 PROTHROMBIN TIME: CPT | Performed by: EMERGENCY MEDICINE

## 2024-08-05 PROCEDURE — 83605 ASSAY OF LACTIC ACID: CPT | Performed by: EMERGENCY MEDICINE

## 2024-08-05 PROCEDURE — 83690 ASSAY OF LIPASE: CPT | Performed by: EMERGENCY MEDICINE

## 2024-08-05 PROCEDURE — 93970 EXTREMITY STUDY: CPT | Performed by: RADIOLOGY

## 2024-08-05 PROCEDURE — 93970 EXTREMITY STUDY: CPT

## 2024-08-05 PROCEDURE — 93005 ELECTROCARDIOGRAM TRACING: CPT

## 2024-08-05 PROCEDURE — 74177 CT ABD & PELVIS W/CONTRAST: CPT | Performed by: STUDENT IN AN ORGANIZED HEALTH CARE EDUCATION/TRAINING PROGRAM

## 2024-08-05 PROCEDURE — 2550000001 HC RX 255 CONTRASTS: Performed by: EMERGENCY MEDICINE

## 2024-08-05 PROCEDURE — 36415 COLL VENOUS BLD VENIPUNCTURE: CPT | Performed by: EMERGENCY MEDICINE

## 2024-08-05 RX ORDER — HEPARIN SODIUM 10000 [USP'U]/100ML
0-4500 INJECTION, SOLUTION INTRAVENOUS CONTINUOUS
Status: DISCONTINUED | OUTPATIENT
Start: 2024-08-05 | End: 2024-08-06

## 2024-08-05 RX ORDER — CYCLOBENZAPRINE HCL 5 MG
5 TABLET ORAL ONCE
Status: COMPLETED | OUTPATIENT
Start: 2024-08-05 | End: 2024-08-05

## 2024-08-05 RX ORDER — OXYCODONE AND ACETAMINOPHEN 5; 325 MG/1; MG/1
1 TABLET ORAL ONCE
Status: COMPLETED | OUTPATIENT
Start: 2024-08-05 | End: 2024-08-05

## 2024-08-05 RX ORDER — MORPHINE SULFATE 4 MG/ML
4 INJECTION, SOLUTION INTRAMUSCULAR; INTRAVENOUS ONCE
Status: DISCONTINUED | OUTPATIENT
Start: 2024-08-05 | End: 2024-08-06

## 2024-08-05 SDOH — SOCIAL STABILITY: SOCIAL INSECURITY: WERE YOU ABLE TO COMPLETE ALL THE BEHAVIORAL HEALTH SCREENINGS?: YES

## 2024-08-05 SDOH — SOCIAL STABILITY: SOCIAL INSECURITY: HAVE YOU HAD THOUGHTS OF HARMING ANYONE ELSE?: NO

## 2024-08-05 ASSESSMENT — PAIN - FUNCTIONAL ASSESSMENT: PAIN_FUNCTIONAL_ASSESSMENT: 0-10

## 2024-08-05 ASSESSMENT — PAIN DESCRIPTION - LOCATION: LOCATION: CHEST

## 2024-08-05 ASSESSMENT — LIFESTYLE VARIABLES
PRESCIPTION_ABUSE_PAST_12_MONTHS: NO
SKIP TO QUESTIONS 9-10: 1
AUDIT-C TOTAL SCORE: 0
SUBSTANCE_ABUSE_PAST_12_MONTHS: NO
HOW OFTEN DO YOU HAVE A DRINK CONTAINING ALCOHOL: NEVER
HOW MANY STANDARD DRINKS CONTAINING ALCOHOL DO YOU HAVE ON A TYPICAL DAY: PATIENT DOES NOT DRINK
AUDIT-C TOTAL SCORE: 0
HOW OFTEN DO YOU HAVE 6 OR MORE DRINKS ON ONE OCCASION: NEVER

## 2024-08-05 ASSESSMENT — PAIN SCALES - GENERAL
PAINLEVEL_OUTOF10: 7
PAINLEVEL_OUTOF10: 9

## 2024-08-05 ASSESSMENT — PATIENT HEALTH QUESTIONNAIRE - PHQ9
2. FEELING DOWN, DEPRESSED OR HOPELESS: NOT AT ALL
SUM OF ALL RESPONSES TO PHQ9 QUESTIONS 1 & 2: 0
1. LITTLE INTEREST OR PLEASURE IN DOING THINGS: NOT AT ALL

## 2024-08-05 ASSESSMENT — COLUMBIA-SUICIDE SEVERITY RATING SCALE - C-SSRS
6. HAVE YOU EVER DONE ANYTHING, STARTED TO DO ANYTHING, OR PREPARED TO DO ANYTHING TO END YOUR LIFE?: NO
2. HAVE YOU ACTUALLY HAD ANY THOUGHTS OF KILLING YOURSELF?: NO
1. IN THE PAST MONTH, HAVE YOU WISHED YOU WERE DEAD OR WISHED YOU COULD GO TO SLEEP AND NOT WAKE UP?: NO

## 2024-08-05 NOTE — ED TRIAGE NOTES
Pt arrives via triage with complaint of pain all over body for two weeks. Now complaint of chest pain onset of today. Pt concerned because pain went from left side of body and now to the chest. Pt denies any V/D. Denies any other associated sx.

## 2024-08-05 NOTE — ED TRIAGE NOTES
TRIAGE NOTE   I saw the patient as the Clinician in Triage and performed a brief history and physical exam, established acuity, and ordered appropriate tests to develop basic plan of care. Patient will be seen by an JORGE A, resident and/or physician who will independently evaluate the patient. Please see subsequent provider notes for further details and disposition.     Brief HPI: In brief, Magdiel Richardson is a 79 y.o. male that presents for left flank pain acute on chronic x 3-4 days  hx of left sided low back pain s/p pain management injection x 5 days ago.  No fever. No dysuria hematuria.     Focused Physical exam:   Heart RRR  Lungs CTAB no w/r/rh  Abd soft non-tender   Flank TTP over left paralumbar  No saddle anesthesia, no weaknss, no urinary/fecal incontinence     Plan/MDM:   Iv labs ct ap pain meds     Please see subsequent provider note for further details and disposition

## 2024-08-06 ENCOUNTER — APPOINTMENT (OUTPATIENT)
Dept: RADIOLOGY | Facility: HOSPITAL | Age: 79
End: 2024-08-06
Payer: MEDICARE

## 2024-08-06 VITALS
HEART RATE: 56 BPM | SYSTOLIC BLOOD PRESSURE: 150 MMHG | HEIGHT: 71 IN | TEMPERATURE: 97.2 F | BODY MASS INDEX: 28.28 KG/M2 | OXYGEN SATURATION: 95 % | DIASTOLIC BLOOD PRESSURE: 90 MMHG | RESPIRATION RATE: 18 BRPM | WEIGHT: 202 LBS

## 2024-08-06 PROBLEM — I26.99 PULMONARY EMBOLISM, UNSPECIFIED CHRONICITY, UNSPECIFIED PULMONARY EMBOLISM TYPE, UNSPECIFIED WHETHER ACUTE COR PULMONALE PRESENT (MULTI): Status: RESOLVED | Noted: 2024-08-05 | Resolved: 2024-08-06

## 2024-08-06 LAB
ANION GAP SERPL CALC-SCNC: 10 MMOL/L (ref 10–20)
ATRIAL RATE: 55 BPM
BUN SERPL-MCNC: 14 MG/DL (ref 6–23)
CALCIUM SERPL-MCNC: 9.9 MG/DL (ref 8.6–10.3)
CARDIAC TROPONIN I PNL SERPL HS: 20 NG/L (ref 0–20)
CHLORIDE SERPL-SCNC: 102 MMOL/L (ref 98–107)
CK SERPL-CCNC: 97 U/L (ref 0–325)
CO2 SERPL-SCNC: 27 MMOL/L (ref 21–32)
CREAT SERPL-MCNC: 0.91 MG/DL (ref 0.5–1.3)
EGFRCR SERPLBLD CKD-EPI 2021: 87 ML/MIN/1.73M*2
ERYTHROCYTE [DISTWIDTH] IN BLOOD BY AUTOMATED COUNT: 12.1 % (ref 11.5–14.5)
GLUCOSE SERPL-MCNC: 101 MG/DL (ref 74–99)
HCT VFR BLD AUTO: 42 % (ref 41–52)
HGB BLD-MCNC: 14.6 G/DL (ref 13.5–17.5)
MCH RBC QN AUTO: 32.7 PG (ref 26–34)
MCHC RBC AUTO-ENTMCNC: 34.8 G/DL (ref 32–36)
MCV RBC AUTO: 94 FL (ref 80–100)
NRBC BLD-RTO: 0 /100 WBCS (ref 0–0)
P AXIS: 100 DEGREES
P OFFSET: 156 MS
P ONSET: 131 MS
PLATELET # BLD AUTO: 219 X10*3/UL (ref 150–450)
POTASSIUM SERPL-SCNC: 3.9 MMOL/L (ref 3.5–5.3)
PR INTERVAL: 194 MS
Q ONSET: 228 MS
QRS COUNT: 9 BEATS
QRS DURATION: 84 MS
QT INTERVAL: 398 MS
QTC CALCULATION(BAZETT): 380 MS
QTC FREDERICIA: 386 MS
R AXIS: 27 DEGREES
RBC # BLD AUTO: 4.46 X10*6/UL (ref 4.5–5.9)
SODIUM SERPL-SCNC: 135 MMOL/L (ref 136–145)
T AXIS: 90 DEGREES
T OFFSET: 427 MS
UFH PPP CHRO-ACNC: 0.4 IU/ML
UFH PPP CHRO-ACNC: 0.4 IU/ML
UFH PPP CHRO-ACNC: 0.9 IU/ML
VENTRICULAR RATE: 55 BPM
WBC # BLD AUTO: 4.9 X10*3/UL (ref 4.4–11.3)

## 2024-08-06 PROCEDURE — 2500000004 HC RX 250 GENERAL PHARMACY W/ HCPCS (ALT 636 FOR OP/ED): Performed by: INTERNAL MEDICINE

## 2024-08-06 PROCEDURE — G0378 HOSPITAL OBSERVATION PER HR: HCPCS

## 2024-08-06 PROCEDURE — 96376 TX/PRO/DX INJ SAME DRUG ADON: CPT | Mod: 59

## 2024-08-06 PROCEDURE — 2500000001 HC RX 250 WO HCPCS SELF ADMINISTERED DRUGS (ALT 637 FOR MEDICARE OP): Performed by: NURSE PRACTITIONER

## 2024-08-06 PROCEDURE — 84484 ASSAY OF TROPONIN QUANT: CPT | Performed by: EMERGENCY MEDICINE

## 2024-08-06 PROCEDURE — 96361 HYDRATE IV INFUSION ADD-ON: CPT

## 2024-08-06 PROCEDURE — 80048 BASIC METABOLIC PNL TOTAL CA: CPT | Performed by: NURSE PRACTITIONER

## 2024-08-06 PROCEDURE — 85027 COMPLETE CBC AUTOMATED: CPT | Performed by: NURSE PRACTITIONER

## 2024-08-06 PROCEDURE — 2550000001 HC RX 255 CONTRASTS: Performed by: INTERNAL MEDICINE

## 2024-08-06 PROCEDURE — 36415 COLL VENOUS BLD VENIPUNCTURE: CPT | Performed by: NURSE PRACTITIONER

## 2024-08-06 PROCEDURE — 2500000004 HC RX 250 GENERAL PHARMACY W/ HCPCS (ALT 636 FOR OP/ED): Performed by: NURSE PRACTITIONER

## 2024-08-06 PROCEDURE — 2500000001 HC RX 250 WO HCPCS SELF ADMINISTERED DRUGS (ALT 637 FOR MEDICARE OP): Performed by: INTERNAL MEDICINE

## 2024-08-06 PROCEDURE — 96366 THER/PROPH/DIAG IV INF ADDON: CPT

## 2024-08-06 PROCEDURE — 99235 HOSP IP/OBS SAME DATE MOD 70: CPT | Performed by: INTERNAL MEDICINE

## 2024-08-06 PROCEDURE — 96365 THER/PROPH/DIAG IV INF INIT: CPT | Mod: 59

## 2024-08-06 PROCEDURE — 36415 COLL VENOUS BLD VENIPUNCTURE: CPT | Performed by: EMERGENCY MEDICINE

## 2024-08-06 PROCEDURE — 2500000004 HC RX 250 GENERAL PHARMACY W/ HCPCS (ALT 636 FOR OP/ED): Performed by: EMERGENCY MEDICINE

## 2024-08-06 PROCEDURE — 85520 HEPARIN ASSAY: CPT | Performed by: NURSE PRACTITIONER

## 2024-08-06 PROCEDURE — 85520 HEPARIN ASSAY: CPT | Performed by: EMERGENCY MEDICINE

## 2024-08-06 PROCEDURE — 96375 TX/PRO/DX INJ NEW DRUG ADDON: CPT | Mod: 59

## 2024-08-06 PROCEDURE — 82550 ASSAY OF CK (CPK): CPT | Performed by: INTERNAL MEDICINE

## 2024-08-06 PROCEDURE — 71275 CT ANGIOGRAPHY CHEST: CPT

## 2024-08-06 RX ORDER — DICLOFENAC SODIUM 25 MG/1
75 TABLET, DELAYED RELEASE ORAL 2 TIMES DAILY
Status: DISCONTINUED | OUTPATIENT
Start: 2024-08-06 | End: 2024-08-06 | Stop reason: HOSPADM

## 2024-08-06 RX ORDER — DICLOFENAC SODIUM 75 MG/1
75 TABLET, DELAYED RELEASE ORAL 2 TIMES DAILY PRN
Qty: 10 TABLET | Refills: 0 | Status: SHIPPED | OUTPATIENT
Start: 2024-08-06

## 2024-08-06 RX ORDER — POLYETHYLENE GLYCOL 3350 17 G/17G
17 POWDER, FOR SOLUTION ORAL DAILY
Qty: 20 PACKET | Refills: 0 | Status: SHIPPED | OUTPATIENT
Start: 2024-08-06

## 2024-08-06 RX ORDER — GABAPENTIN 300 MG/1
600 CAPSULE ORAL NIGHTLY
Qty: 30 CAPSULE | Refills: 0 | Status: SHIPPED | OUTPATIENT
Start: 2024-08-06

## 2024-08-06 RX ORDER — POLYETHYLENE GLYCOL 3350 17 G/17G
17 POWDER, FOR SOLUTION ORAL DAILY PRN
Status: DISCONTINUED | OUTPATIENT
Start: 2024-08-06 | End: 2024-08-06 | Stop reason: HOSPADM

## 2024-08-06 RX ORDER — GABAPENTIN 300 MG/1
300 CAPSULE ORAL 3 TIMES DAILY
Status: DISCONTINUED | OUTPATIENT
Start: 2024-08-06 | End: 2024-08-06 | Stop reason: HOSPADM

## 2024-08-06 RX ORDER — OXYCODONE HYDROCHLORIDE 5 MG/1
5 TABLET ORAL 2 TIMES DAILY PRN
Qty: 14 TABLET | Refills: 0 | Status: SHIPPED | OUTPATIENT
Start: 2024-08-06 | End: 2024-08-09 | Stop reason: SDUPTHER

## 2024-08-06 RX ORDER — MEMANTINE HYDROCHLORIDE 5 MG/1
10 TABLET ORAL 2 TIMES DAILY
Status: DISCONTINUED | OUTPATIENT
Start: 2024-08-06 | End: 2024-08-06 | Stop reason: HOSPADM

## 2024-08-06 RX ORDER — OXYCODONE HYDROCHLORIDE 5 MG/1
5 TABLET ORAL EVERY 4 HOURS PRN
Status: DISCONTINUED | OUTPATIENT
Start: 2024-08-06 | End: 2024-08-06

## 2024-08-06 RX ORDER — TALC
3 POWDER (GRAM) TOPICAL NIGHTLY PRN
Status: DISCONTINUED | OUTPATIENT
Start: 2024-08-06 | End: 2024-08-06 | Stop reason: HOSPADM

## 2024-08-06 RX ORDER — CYCLOBENZAPRINE HCL 5 MG
5 TABLET ORAL 3 TIMES DAILY PRN
Status: DISCONTINUED | OUTPATIENT
Start: 2024-08-06 | End: 2024-08-06 | Stop reason: HOSPADM

## 2024-08-06 RX ORDER — ACETAMINOPHEN 325 MG/1
650 TABLET ORAL EVERY 6 HOURS PRN
Status: DISCONTINUED | OUTPATIENT
Start: 2024-08-06 | End: 2024-08-06 | Stop reason: HOSPADM

## 2024-08-06 RX ORDER — OXYCODONE HYDROCHLORIDE 5 MG/1
5 TABLET ORAL EVERY 6 HOURS PRN
Status: DISCONTINUED | OUTPATIENT
Start: 2024-08-06 | End: 2024-08-06 | Stop reason: HOSPADM

## 2024-08-06 RX ORDER — TRAMADOL HYDROCHLORIDE 50 MG/1
50 TABLET ORAL EVERY 8 HOURS PRN
Status: DISCONTINUED | OUTPATIENT
Start: 2024-08-06 | End: 2024-08-06

## 2024-08-06 RX ORDER — GABAPENTIN 300 MG/1
300 CAPSULE ORAL ONCE
Status: COMPLETED | OUTPATIENT
Start: 2024-08-06 | End: 2024-08-06

## 2024-08-06 RX ORDER — DEXAMETHASONE SODIUM PHOSPHATE 10 MG/ML
10 INJECTION INTRAMUSCULAR; INTRAVENOUS ONCE
Status: COMPLETED | OUTPATIENT
Start: 2024-08-06 | End: 2024-08-06

## 2024-08-06 RX ORDER — OXYCODONE HYDROCHLORIDE 5 MG/1
10 TABLET ORAL EVERY 4 HOURS PRN
Status: DISCONTINUED | OUTPATIENT
Start: 2024-08-06 | End: 2024-08-06

## 2024-08-06 RX ORDER — GABAPENTIN 300 MG/1
300 CAPSULE ORAL 2 TIMES DAILY
Qty: 30 CAPSULE | Refills: 0 | Status: SHIPPED | OUTPATIENT
Start: 2024-08-06 | End: 2024-08-09 | Stop reason: SDUPTHER

## 2024-08-06 RX ORDER — DEXAMETHASONE 2 MG/1
TABLET ORAL
Qty: 25 TABLET | Refills: 0 | Status: SHIPPED | OUTPATIENT
Start: 2024-08-07 | End: 2024-08-16

## 2024-08-06 RX ORDER — ONDANSETRON HYDROCHLORIDE 2 MG/ML
4 INJECTION, SOLUTION INTRAVENOUS EVERY 8 HOURS PRN
Status: DISCONTINUED | OUTPATIENT
Start: 2024-08-06 | End: 2024-08-06 | Stop reason: HOSPADM

## 2024-08-06 ASSESSMENT — COGNITIVE AND FUNCTIONAL STATUS - GENERAL
WALKING IN HOSPITAL ROOM: A LITTLE
DAILY ACTIVITIY SCORE: 23
CLIMB 3 TO 5 STEPS WITH RAILING: A LITTLE
PATIENT BASELINE BEDBOUND: NO
MOBILITY SCORE: 21
STANDING UP FROM CHAIR USING ARMS: A LITTLE
TOILETING: A LITTLE

## 2024-08-06 ASSESSMENT — PAIN SCALES - GENERAL
PAINLEVEL_OUTOF10: 9
PAINLEVEL_OUTOF10: 0 - NO PAIN
PAINLEVEL_OUTOF10: 6
PAINLEVEL_OUTOF10: 5 - MODERATE PAIN
PAINLEVEL_OUTOF10: 10 - WORST POSSIBLE PAIN
PAINLEVEL_OUTOF10: 0 - NO PAIN
PAINLEVEL_OUTOF10: 9
PAINLEVEL_OUTOF10: 0 - NO PAIN
PAINLEVEL_OUTOF10: 0 - NO PAIN
PAINLEVEL_OUTOF10: 7
PAINLEVEL_OUTOF10: 0 - NO PAIN
PAINLEVEL_OUTOF10: 2
PAINLEVEL_OUTOF10: 1
PAINLEVEL_OUTOF10: 0 - NO PAIN

## 2024-08-06 ASSESSMENT — ENCOUNTER SYMPTOMS
WEAKNESS: 1
SHORTNESS OF BREATH: 0
BACK PAIN: 1
HEMATURIA: 0
CHILLS: 0
ABDOMINAL PAIN: 0
PALPITATIONS: 0
CHEST TIGHTNESS: 1
VOMITING: 0
NAUSEA: 0
FEVER: 0
COUGH: 0
NUMBNESS: 1
DIFFICULTY URINATING: 0
DYSURIA: 0

## 2024-08-06 ASSESSMENT — ACTIVITIES OF DAILY LIVING (ADL)
HEARING - LEFT EAR: FUNCTIONAL
JUDGMENT_ADEQUATE_SAFELY_COMPLETE_DAILY_ACTIVITIES: YES
ADEQUATE_TO_COMPLETE_ADL: YES
FEEDING YOURSELF: INDEPENDENT
TOILETING: INDEPENDENT
GROOMING: INDEPENDENT
PATIENT'S MEMORY ADEQUATE TO SAFELY COMPLETE DAILY ACTIVITIES?: YES
DRESSING YOURSELF: INDEPENDENT
WALKS IN HOME: NEEDS ASSISTANCE
BATHING: INDEPENDENT
HEARING - RIGHT EAR: FUNCTIONAL
LACK_OF_TRANSPORTATION: NO
ASSISTIVE_DEVICE: WALKER

## 2024-08-06 ASSESSMENT — PAIN - FUNCTIONAL ASSESSMENT
PAIN_FUNCTIONAL_ASSESSMENT: 0-10

## 2024-08-06 ASSESSMENT — PAIN DESCRIPTION - LOCATION: LOCATION: HIP

## 2024-08-06 ASSESSMENT — PAIN DESCRIPTION - DESCRIPTORS: DESCRIPTORS: SHARP

## 2024-08-06 ASSESSMENT — PAIN DESCRIPTION - ORIENTATION
ORIENTATION: LEFT
ORIENTATION: LEFT

## 2024-08-06 NOTE — DISCHARGE SUMMARY
Discharge Diagnosis  Acute lumbar radiculopathy    Issues Requiring Follow-Up      Discharge Meds     Your medication list        START taking these medications        Instructions Last Dose Given Next Dose Due   dexAMETHasone 2 mg tablet  Commonly known as: Decadron  Start taking on: August 7, 2024      Take 5 tablets (10 mg) by mouth once daily for 1 day, THEN 4 tablets (8 mg) once daily for 2 days, THEN 3 tablets (6 mg) once daily for 2 days, THEN 2 tablets (4 mg) once daily for 2 days, THEN 1 tablet (2 mg) once daily for 2 days. Do not fill before August 7, 2024.       oxyCODONE 5 mg immediate release tablet  Commonly known as: Roxicodone      Take 1 tablet (5 mg) by mouth 2 times a day as needed for severe pain (7 - 10) for up to 7 days.       polyethylene glycol 17 gram packet  Commonly known as: Glycolax, Miralax      Take 17 g by mouth once daily.              CHANGE how you take these medications        Instructions Last Dose Given Next Dose Due   diclofenac 75 mg EC tablet  Commonly known as: Voltaren  What changed:   when to take this  reasons to take this      Take 1 tablet (75 mg) by mouth 2 times a day as needed (back pain). Do not crush, chew, or split.       gabapentin 300 mg capsule  Commonly known as: Neurontin  What changed: when to take this      Take 1 capsule (300 mg) by mouth 2 times a day.       gabapentin 300 mg capsule  Commonly known as: Neurontin  What changed: You were already taking a medication with the same name, and this prescription was added. Make sure you understand how and when to take each.      Take 2 capsules (600 mg) by mouth once daily at bedtime.              CONTINUE taking these medications        Instructions Last Dose Given Next Dose Due   memantine 10 mg tablet  Commonly known as: Namenda      Take 1 tablet (10 mg) by mouth 2 times a day.                 Where to Get Your Medications        These medications were sent to [id number] [place], [place] - [address]  [address] [facility] [address]      Phone: [phone]   dexAMETHasone 2 mg tablet  diclofenac 75 mg EC tablet  gabapentin 300 mg capsule  gabapentin 300 mg capsule  oxyCODONE 5 mg immediate release tablet  polyethylene glycol 17 gram packet         Test Results Pending At Discharge  Pending Labs       No current pending labs.            Hospital Course  Magdiel Richardson is a 79-year-old male with history of prostate cancer, Alzheimer's, bilateral sciatica, and spinal stenosis who presented for left lower back and leg pain. Patient's wife was at bedside and contributed to history. He was seen in the ED 7/25/24 for left lower back pain that radiated down his left leg and improved with muscle relaxer and analgesics. He follow-up with primary care and received an injection into the left hip. He also followed-up with pain management who started him on PO Diclofenac and Gabapentin. Since then the pain has worsened. Patient states he was getting sharp pains that radiated from his left lower back down the left leg. He also felt tingling around both ankles and feet. He mobility has been limited due to pain for which he has had to use a walker. Last night he began experiencing chest pain that felt like a tightness sensation. Denied injury, fever, chills, SOB, cough, hemoptysis, history of blood clot, weakness, urinary/fecal incontinence.     CT scan was ordered in the ED and showed a possible segmental pulmonary embolism in the left lower lobe and severe degenerative changes of the lumbar spine for severe canal stenosis to degree that would result in nerve root compression. Pt was worked up for possible PE. Vascular US was ordered for bilateral lower extremities and showed no evidence for a DVT. CT angiogram of the chest showed no acute pulmonary embolism through the segmental branch level so heparin was discontinued. Patient was treated symptomatically with IV fluids along with Flexeril, 10mg  Pt is a 55 y/o F PMHx anemia, appendectomy, uterine fibroids p/w chest pain yesterday -- symptomatic anemia, ACS ruled out -- blood transfusion, rpt cbc Decadron, gabapentin, and Percocet. Patient's symptoms greatly improved after the 10mg of Decadron from 11/10 to 0.5/10 pain. Thus we suspect his lower back and leg pain was due to acute lumbar radiculopathy due to severe spinal stenosis causing nerve root compression. Patient was able to ambulate well with walker, significant improvement in pain, and vital signs stable. Discussed with patient to follow-up with pain management for possible injection. Recommend steroid taper and take gabapentin one pill in morning, one in afternoon, and two at night. Patient and wife verbalized understanding and agreement with plan.    Pertinent Physical Exam At Time of Discharge  Physical Exam  Constitutional:       Appearance: Normal appearance.   HENT:      Head: Normocephalic.   Cardiovascular:      Rate and Rhythm: Normal rate and regular rhythm.      Heart sounds: Normal heart sounds.   Pulmonary:      Effort: Pulmonary effort is normal.      Breath sounds: Normal breath sounds.   Abdominal:      General: Abdomen is flat.      Palpations: Abdomen is soft.      Tenderness: There is no abdominal tenderness.   Musculoskeletal:         General: No tenderness.      Cervical back: Normal range of motion.      Right lower leg: No edema.      Left lower leg: No edema.   Skin:     General: Skin is warm and dry.   Neurological:      Mental Status: He is alert and oriented to person, place, and time. Mental status is at baseline.      Motor: No weakness.      Gait: Gait (able to ambulate well with walker) normal.   Psychiatric:         Mood and Affect: Mood normal.         Behavior: Behavior normal.           Outpatient Follow-Up  Future Appointments   Date Time Provider Department Center   8/12/2024  9:30 AM Ursula Ralph MD [id number]

## 2024-08-06 NOTE — ED PROVIDER NOTES
HPI   Chief Complaint   Patient presents with    Chest Pain    Weakness, Gen       Chief complaint: Chest pain, weakness    History of present illness: Patient is a 79-year-old male with history of Alzheimer's sciatica presenting to the emergency department with complaints of weakness.  According to the patient, he has been experiencing back pain over the past 5 days.  The patient states that he recently had injections by pain management for this pain several days ago.  The patient states that he does not take any blood thinners.  The patient denies any fever with this.  Concerned that his symptoms or not improving, the patient presents to the emergency department for further evaluation.  Patient has no other complaints at this time he denies any changes in his bowel or bladder habits.      History provided by:  Patient   used: No            Patient History   No past medical history on file.  Past Surgical History:   Procedure Laterality Date    OTHER SURGICAL HISTORY  08/19/2016    History Of Prior Surgery    PROSTATE SURGERY  08/19/2016    Prostate Surgery     No family history on file.  Social History     Tobacco Use    Smoking status: Never    Smokeless tobacco: Never   Vaping Use    Vaping status: Never Used   Substance Use Topics    Alcohol use: Never    Drug use: Never       Physical Exam   ED Triage Vitals   Temperature Heart Rate Respirations BP   08/05/24 1610 08/05/24 1610 08/05/24 1610 08/05/24 1610   36.8 °C (98.2 °F) 54 18 (!) 171/99      Pulse Ox Temp Source Heart Rate Source Patient Position   08/05/24 1610 08/05/24 1857 08/05/24 1857 --   99 % Oral Monitor       BP Location FiO2 (%)     08/05/24 1857 --     Left arm        Physical Exam  Vitals and nursing note reviewed.   Constitutional:       General: He is not in acute distress.     Appearance: He is well-developed.   HENT:      Head: Normocephalic and atraumatic.   Eyes:      Conjunctiva/sclera: Conjunctivae normal.    Cardiovascular:      Rate and Rhythm: Normal rate and regular rhythm.      Heart sounds: No murmur heard.  Pulmonary:      Effort: Pulmonary effort is normal. No respiratory distress.      Breath sounds: Normal breath sounds.   Abdominal:      Palpations: Abdomen is soft.      Tenderness: There is no abdominal tenderness.   Musculoskeletal:         General: No swelling.      Cervical back: Neck supple.      Lumbar back: Tenderness present. No bony tenderness.   Skin:     General: Skin is warm and dry.      Capillary Refill: Capillary refill takes less than 2 seconds.   Neurological:      Mental Status: He is alert.   Psychiatric:         Mood and Affect: Mood normal.           ED Course & MDM                        Steven Coma Scale Score: 15                        Medical Decision Making  Medical decision making: Patient remained stable throughout his time in the emergency department.  CBC demonstrated no significant abnormalities Chem-7 and LFTs are within normal limits lactate was 1.2 PTT was 36 lipase was 11 INR was 1.1.  CAT scan of the patient's abdomen pelvis with IV contrast demonstrated a segmental pulmonary embolus in the left lower lobe of the chest and no other significant acute abnormality in the abdomen or pelvis.  Venous duplex of the patient's bilateral lower extremities demonstrated no evidence of DVT.  The patient's EKG demonstrated a sinus bradycardia with a rate of 55 bpm isoelectric ST segments narrow QRS complexes and a QTc of 380.    Patient presents to the emergency department with complaints of pain and weakness in his lower extremities however to my surprise, the patient was found to have a incidental pulmonary embolus on his CAT scan.  At this time, I am hesitant to discharge patient home as his chest has not been completely visualized.  Patient was given Percocet Flexeril in the emergency department he was started on heparin bolus and drip.  I spoke with the hospitalist who agreed the  patient could be admitted to their service for further evaluation and therapy.  This was explained to the patient and family who expressed understanding the patient was then admitted to the hospital in otherwise stable condition.    Amount and/or Complexity of Data Reviewed  Labs: ordered. Decision-making details documented in ED Course.  Radiology: ordered. Decision-making details documented in ED Course.  ECG/medicine tests: ordered and independent interpretation performed. Decision-making details documented in ED Course.        Procedure  Procedures     Jalil Capellan MD  08/09/24 4940

## 2024-08-06 NOTE — PROGRESS NOTES
Pharmacy Medication History Review   Spoke to the patients spouse    Magdiel Richardson is a 79 y.o. male admitted for Pulmonary embolism, unspecified chronicity, unspecified pulmonary embolism type, unspecified whether acute cor pulmonale present (Multi). Pharmacy reviewed the patient's [id number] medications and allergies for accuracy.    The list below reflectives the updated PTA list. Please review each medication in order reconciliation for additional clarification and justification.  Prior to Admission Medications   Prescriptions Last Dose Informant   diclofenac (Voltaren) 75 mg EC tablet 8/5/2024    Sig: Take 1 tablet (75 mg) by mouth 2 times a day. Do not crush, chew, or split.   gabapentin (Neurontin) 300 mg capsule 8/5/2024    Sig: Take 1 capsule (300 mg) by mouth 3 times a day.   memantine (Namenda) 10 mg tablet 8/5/2024    Sig: Take 1 tablet (10 mg) by mouth 2 times a day.      Facility-Administered Medications: None       The list below reflectives the updated allergy list. Please review each documented allergy for additional clarification and justification.  Allergies  Reviewed by Matias Martini RN on 8/5/2024   No Known Allergies         Below are additional concerns with the patient's PTA list.      Nora Tse

## 2024-08-06 NOTE — H&P
History Of Present Illness  Magdiel Richardson is a 79 y.o. male with history of Alzheimer's, prostate cancer, bilateral sciatica, spinal stenosis, presenting with left lower back and leg pain and chest pain. Patient's wife is at bedside and contributed to his history. He was seen in the ED on 7/25/24 for left lower back pain that radiated down his left leg that improved with muscle relaxer and analgesics. He followed-up with pain management and received an injection this past Thursday. Since then the pain has worsened. Patient states he was getting sharp pains that started in his left lower back and radiated down his leg. He also feels tingling around both ankles and feet. Last night he began experiencing chest pain that felt like tightness sensation. His wife states his mobility has been limited due to the pain and he has been using a walker. Denies injury, fever, chills, SOB, cough, hemoptysis, history of blood clot.       Past Medical History  He has no past medical history on file.    Surgical History  He has a past surgical history that includes Prostate surgery (08/19/2016) and Other surgical history (08/19/2016).     Social History  He reports that he has never smoked. He has never used smokeless tobacco. He reports that he does not drink alcohol and does not use drugs.    Family History  No family history on file.     Allergies  Patient has no known allergies.    Review of Systems   Constitutional:  Negative for chills and fever.   Respiratory:  Positive for chest tightness. Negative for cough and shortness of breath.    Cardiovascular:  Positive for chest pain. Negative for palpitations.   Gastrointestinal:  Negative for abdominal pain, nausea and vomiting.   Genitourinary:  Negative for difficulty urinating, dysuria and hematuria.   Musculoskeletal:  Positive for back pain.   Neurological:  Positive for weakness (generalized) and numbness.          Physical Exam  Constitutional:       General: He is not in  acute distress.     Appearance: He is not ill-appearing.   HENT:      Head: Normocephalic.   Cardiovascular:      Rate and Rhythm: Normal rate and regular rhythm.      Heart sounds: Normal heart sounds.   Pulmonary:      Effort: Pulmonary effort is normal.      Breath sounds: Normal breath sounds.   Chest:      Chest wall: No tenderness.   Abdominal:      General: Abdomen is flat.      Palpations: Abdomen is soft.      Tenderness: There is no abdominal tenderness.   Musculoskeletal:         General: Tenderness present. No signs of injury.      Cervical back: Normal range of motion.      Right lower leg: No edema.      Left lower leg: No edema.      Comments: No midline tenderness. Positive left straight leg raise. Equal strength of bilateral legs. ROM intact.    Skin:     General: Skin is warm and dry.   Neurological:      Mental Status: He is alert. Mental status is at baseline.   Psychiatric:         Mood and Affect: Mood normal.         Behavior: Behavior normal.       Last Recorded Vitals  BP (!) 170/100 (BP Location: Left arm, Patient Position: Sitting)   Pulse 55   Temp 36.7 °C (98.1 °F)   Resp 18   Wt 91.6 kg (202 lb)   SpO2 99%     Relevant Results    Assessment/Plan   Magdiel is a 79-year-old male with history of prostate cancer, Alzheimer's, bilateral sciatica, and spinal stenosis presenting for left lower back and leg pain, and chest pain.     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  I agree with assessment/plan above unless stated explicitly.  Picture consistent with radiculopathy (sciatica) 2/2 lumbar spine disease.   Will try higher dose of steroid burst. Referral for outpatient epidural injection.  Possible PE on CT abd, but I suspect is artifact; CT PE is negative.     Audi Gross MD

## 2024-08-06 NOTE — PROGRESS NOTES
08/06/24 0918   [facility] Disability Status   Are you deaf or do you have serious difficulty hearing? N   Are you blind or do you have serious difficulty seeing, even when wearing glasses? N   Because of a physical, mental, or emotional condition, do you have serious difficulty concentrating, remembering, or making decisions? (5 years old or older) N   Do you have serious difficulty walking or climbing stairs? N   Do you have serious difficulty dressing or bathing? N   Because of a physical, mental, or emotional condition, do you have serious difficulty doing errands alone such as visiting the doctor? N

## 2024-08-06 NOTE — PROGRESS NOTES
Transitional Care Coordination Progress Note:  Plan per Medical/Surgical team: treatment of PE with heparin gtt  Status: Observation  Payor source: [employer]  Discharge disposition: Home with wife   Potential Barriers: body pain with Percocet, flexeril. Tylenol   ADOD: 8/7/2024   JANELLE Ledbetter RN, BSN Transitional Care Coordinator ED# [phone]      08/06/24 0919   Discharge Planning   Living Arrangements Spouse/significant other   Support Systems Spouse/significant other   Assistance Needed anticoagulation plan   Type of Residence Private residence   Number of Stairs to Enter Residence 1   Number of Stairs Within Residence 0   Home or Post Acute Services None   Expected Discharge Disposition Home   Does the patient need discharge transport arranged? No   Financial Resource Strain   How hard is it for you to pay for the very basics like food, housing, medical care, and heating? Not hard   Housing Stability   In the last 12 months, was there a time when you were not able to pay the mortgage or rent on time? Y   In the past 12 months, how many times have you moved where you were living? 1   At any time in the past 12 months, were you homeless or living in a shelter (including now)? Y   Transportation Needs   In the past 12 months, has lack of transportation kept you from medical appointments or from getting medications? no   In the past 12 months, has lack of transportation kept you from meetings, work, or from getting things needed for daily living? No

## 2024-08-06 NOTE — CARE PLAN
The patient's goals for the shift include      The clinical goals for the shift include Pt pain will be managed throughout shift      Problem: Nutrition  Goal: Less than 5 days NPO/clear liquids  Outcome: Met  Goal: Oral intake greater than 50%  Outcome: Met  Goal: Oral intake greater 75%  Outcome: Met  Goal: Consume prescribed supplement  Outcome: Met  Goal: Adequate PO fluid intake  Outcome: Met  Goal: Nutrition support goals are met within 48 hrs  Outcome: Met  Goal: Nutrition support is meeting 75% of nutrient needs  Outcome: Met  Goal: Tube feed tolerance  Outcome: Met  Goal: BG  mg/dL  Outcome: Met  Goal: Lab values WNL  Outcome: Met  Goal: Electrolytes WNL  Outcome: Met  Goal: Promote healing  Outcome: Met  Goal: Maintain stable weight  Outcome: Met  Goal: Reduce weight from edema/fluid  Outcome: Met  Goal: Gradual weight gain  Outcome: Met  Goal: Improve ostomy output  Outcome: Met     Problem: Pain - Adult  Goal: Verbalizes/displays adequate comfort level or baseline comfort level  Outcome: Met     Problem: Safety - Adult  Goal: Free from fall injury  Outcome: Met     Problem: Discharge Planning  Goal: Discharge to home or other facility with appropriate resources  Outcome: Met     Problem: Chronic Conditions and Co-morbidities  Goal: Patient's chronic conditions and co-morbidity symptoms are monitored and maintained or improved  Outcome: Met     Problem: Pain  Goal: Takes deep breaths with improved pain control throughout the shift  Outcome: Met  Goal: Turns in bed with improved pain control throughout the shift  Outcome: Met  Goal: Walks with improved pain control throughout the shift  Outcome: Met  Goal: Performs ADL's with improved pain control throughout shift  Outcome: Met  Goal: Participates in PT with improved pain control throughout the shift  Outcome: Met  Goal: Free from opioid side effects throughout the shift  Outcome: Met  Goal: Free from acute confusion related to pain meds throughout  the shift  Outcome: Met

## 2024-08-06 NOTE — PROGRESS NOTES
Home with wife      08/06/24 0128   Current Planned Discharge Disposition   Current Planned Discharge Disposition Home

## 2024-08-07 ENCOUNTER — PATIENT OUTREACH (OUTPATIENT)
Dept: CARE COORDINATION | Facility: CLINIC | Age: 79
End: 2024-08-07
Payer: MEDICARE

## 2024-08-07 NOTE — PROGRESS NOTES
Discharge Facility:[facility]  Discharge Diagnosis:Left lower leg and back pain  Admission Date:08/05/24  Discharge Date: 08/06/24    PCP Appointment Date:08/09/24  Specialist Appointment Date:   Hospital Encounter and Summary Linked: Yes  See discharge assessment below for further details  Engagement  Call Start Time: 0920 (8/7/2024  9:20 AM)    Medications  Medications reviewed with patient/caregiver?: Yes (8/7/2024  9:20 AM)  Is the patient having any side effects they believe may be caused by any medication additions or changes?: No (8/7/2024  9:20 AM)  Does the patient have all medications ordered at discharge?: Yes (8/7/2024  9:20 AM)  Is the patient taking all medications as directed (includes completed medication regime)?: Yes (8/7/2024  9:20 AM)    Appointments  Does the patient have a primary care provider?: Yes (8/7/2024  9:20 AM)  Care Management Interventions: Verified appointment date/time/provider (8/7/2024  9:20 AM)    Self Management  Has home health visited the patient within 72 hours of discharge?: Not applicable (8/7/2024  9:20 AM)  Has all Durable Medical Equipment (DME) been delivered?: No (8/7/2024  9:20 AM)    Patient Teaching  Does the patient have access to their discharge instructions?: Yes (8/7/2024  9:20 AM)  Care Management Interventions: Reviewed instructions with patient (8/7/2024  9:20 AM)  What is the patient's perception of their health status since discharge?: Improving (8/7/2024  9:20 AM)    Wrap Up  Call End Time: 0930 (8/7/2024  9:20 AM)

## 2024-08-09 ENCOUNTER — OFFICE VISIT (OUTPATIENT)
Dept: PRIMARY CARE | Facility: CLINIC | Age: 79
End: 2024-08-09
Payer: MEDICARE

## 2024-08-09 VITALS
RESPIRATION RATE: 16 BRPM | SYSTOLIC BLOOD PRESSURE: 148 MMHG | HEART RATE: 65 BPM | OXYGEN SATURATION: 98 % | BODY MASS INDEX: 28.05 KG/M2 | TEMPERATURE: 98.2 F | DIASTOLIC BLOOD PRESSURE: 96 MMHG | WEIGHT: 201 LBS

## 2024-08-09 DIAGNOSIS — M54.32 SCIATICA OF LEFT SIDE: ICD-10-CM

## 2024-08-09 DIAGNOSIS — M54.16 LUMBAR RADICULOPATHY: ICD-10-CM

## 2024-08-09 DIAGNOSIS — M48.061 SPINAL STENOSIS OF LUMBAR REGION WITHOUT NEUROGENIC CLAUDICATION: ICD-10-CM

## 2024-08-09 DIAGNOSIS — Z09 HOSPITAL DISCHARGE FOLLOW-UP: Primary | ICD-10-CM

## 2024-08-09 PROCEDURE — 1036F TOBACCO NON-USER: CPT | Performed by: INTERNAL MEDICINE

## 2024-08-09 PROCEDURE — 99496 TRANSJ CARE MGMT HIGH F2F 7D: CPT | Performed by: INTERNAL MEDICINE

## 2024-08-09 PROCEDURE — 1160F RVW MEDS BY RX/DR IN RCRD: CPT | Performed by: INTERNAL MEDICINE

## 2024-08-09 PROCEDURE — 1159F MED LIST DOCD IN RCRD: CPT | Performed by: INTERNAL MEDICINE

## 2024-08-09 RX ORDER — OXYCODONE HYDROCHLORIDE 5 MG/1
5 TABLET ORAL 2 TIMES DAILY PRN
Qty: 14 TABLET | Refills: 0 | Status: SHIPPED | OUTPATIENT
Start: 2024-08-09 | End: 2024-08-16

## 2024-08-09 RX ORDER — GABAPENTIN 300 MG/1
300 CAPSULE ORAL 2 TIMES DAILY
Qty: 60 CAPSULE | Refills: 0 | Status: SHIPPED | OUTPATIENT
Start: 2024-08-09 | End: 2024-09-08

## 2024-08-09 ASSESSMENT — ENCOUNTER SYMPTOMS
HEMATURIA: 0
NECK STIFFNESS: 0
HALLUCINATIONS: 0
EYE REDNESS: 0
FLANK PAIN: 0
DYSURIA: 0
VOICE CHANGE: 0
BLOOD IN STOOL: 0
SINUS PAIN: 0
APPETITE CHANGE: 0
PALPITATIONS: 0
SEIZURES: 0
FREQUENCY: 0
SINUS PRESSURE: 0
ARTHRALGIAS: 1
BACK PAIN: 1
NERVOUS/ANXIOUS: 0
ABDOMINAL DISTENTION: 0
SHORTNESS OF BREATH: 0
CONSTIPATION: 0
CHOKING: 0
POLYDIPSIA: 0
CHEST TIGHTNESS: 0
PHOTOPHOBIA: 0
SPEECH DIFFICULTY: 0
VOMITING: 0
DIFFICULTY URINATING: 0
ABDOMINAL PAIN: 0
EYE DISCHARGE: 0
FEVER: 0
BRUISES/BLEEDS EASILY: 0
HEADACHES: 0
DIARRHEA: 0
WHEEZING: 0
ACTIVITY CHANGE: 0
NAUSEA: 0
MYALGIAS: 1
CONFUSION: 0
DYSPHORIC MOOD: 0
FACIAL ASYMMETRY: 0
COLOR CHANGE: 0
DIZZINESS: 0
SORE THROAT: 0
TROUBLE SWALLOWING: 0
COUGH: 0
RHINORRHEA: 0
NUMBNESS: 0
POLYPHAGIA: 0
STRIDOR: 0
FATIGUE: 1
WEAKNESS: 1

## 2024-08-09 NOTE — PROGRESS NOTES
"Patient: Magdiel Richardson  : 1945  PCP: Flako Miguel MD  MRN: [id number]  Program: Transitional Care Management  Status: Enrolled  Effective Dates: 2024 - present  Responsible Staff: Molly Estrada LPN  Social Determinants to be Addressed: Physical Activity, Social Connections, Stress      Magdiel Richardson is a 79 y.o. male presenting today for follow-up after being discharged from the hospital 4 days ago. The main problem requiring admission was Acute back pain with left sciatica. The discharge summary and/or Transitional Care Management documentation was reviewed. Medication reconciliation was performed as indicated via the \"Dk as Reviewed\" timestamp.     Magdiel Richardson was contacted by Transitional Care Management services two days after his discharge. This encounter and supporting documentation was reviewed.    Review of Systems   Constitutional:  Positive for fatigue. Negative for activity change, appetite change and fever.   HENT:  Negative for congestion, dental problem, ear pain, hearing loss, rhinorrhea, sinus pressure, sinus pain, sore throat, trouble swallowing and voice change.    Eyes:  Negative for photophobia, discharge, redness and visual disturbance.   Respiratory:  Negative for cough, choking, chest tightness, shortness of breath, wheezing and stridor.    Cardiovascular:  Negative for chest pain, palpitations and leg swelling.   Gastrointestinal:  Negative for abdominal distention, abdominal pain, blood in stool, constipation, diarrhea, nausea and vomiting.   Endocrine: Negative for polydipsia, polyphagia and polyuria.   Genitourinary:  Negative for difficulty urinating, dysuria, flank pain, frequency, hematuria and urgency.   Musculoskeletal:  Positive for arthralgias, back pain, gait problem and myalgias. Negative for neck stiffness.   Skin:  Negative for color change and rash.   Allergic/Immunologic: Negative for immunocompromised state.   Neurological:  Positive for weakness. " Negative for dizziness, seizures, syncope, facial asymmetry, speech difficulty, numbness and headaches.   Hematological:  Does not bruise/bleed easily.   Psychiatric/Behavioral:  Negative for behavioral problems, confusion, dysphoric mood, hallucinations and suicidal ideas. The patient is not nervous/anxious.      BP (!) 148/96 (BP Location: Right arm, Patient Position: Sitting, BP Cuff Size: Adult)   Pulse 65   Temp 36.8 °C (98.2 °F) (Skin)   Resp 16   Wt 91.2 kg (201 lb)   SpO2 98%   BMI 28.05 kg/m²     Physical Exam  Vitals and nursing note reviewed.   Constitutional:       General: He is in acute distress (due to pain).      Appearance: He is not toxic-appearing.   HENT:      Head: Normocephalic.      Nose: Nose normal. No congestion.   Eyes:      Conjunctiva/sclera: Conjunctivae normal.   Cardiovascular:      Rate and Rhythm: Normal rate and regular rhythm.      Pulses: Normal pulses.      Heart sounds: Normal heart sounds.   Pulmonary:      Effort: Pulmonary effort is normal. No respiratory distress.      Breath sounds: Normal breath sounds. No stridor. No wheezing, rhonchi or rales.   Musculoskeletal:         General: No signs of injury.      Cervical back: Normal range of motion.      Right lower leg: No edema.      Left lower leg: No edema.   Neurological:      General: No focal deficit present.      Mental Status: He is alert and oriented to person, place, and time.      Gait: Gait abnormal (On wheelchair).   Psychiatric:      Comments: Anxious appearing       The complexity of medical decision making for this patient's transitional care is high.    Assessment/Plan   Problem List Items Addressed This Visit       Sciatica    Relevant Medications    oxyCODONE (Roxicodone) 5 mg immediate release tablet     Other Visit Diagnoses       Hospital discharge follow-up    -  Primary    Spinal stenosis of lumbar region without neurogenic claudication        Relevant Orders    Referral to Spine Surgery    Lumbar  radiculopathy        Relevant Medications    gabapentin (Neurontin) 300 mg capsule

## 2024-08-12 ENCOUNTER — OFFICE VISIT (OUTPATIENT)
Dept: VASCULAR SURGERY | Facility: CLINIC | Age: 79
End: 2024-08-12
Payer: MEDICARE

## 2024-08-12 VITALS — DIASTOLIC BLOOD PRESSURE: 82 MMHG | HEART RATE: 63 BPM | RESPIRATION RATE: 18 BRPM | SYSTOLIC BLOOD PRESSURE: 100 MMHG

## 2024-08-12 DIAGNOSIS — I71.43 INFRARENAL ABDOMINAL AORTIC ANEURYSM (AAA) WITHOUT RUPTURE (CMS-HCC): Primary | ICD-10-CM

## 2024-08-12 DIAGNOSIS — I73.9 PAD (PERIPHERAL ARTERY DISEASE) (CMS-HCC): ICD-10-CM

## 2024-08-12 DIAGNOSIS — I72.3 ILIAC ANEURYSM (CMS-HCC): ICD-10-CM

## 2024-08-12 PROCEDURE — 1159F MED LIST DOCD IN RCRD: CPT | Performed by: SURGERY

## 2024-08-12 PROCEDURE — 1125F AMNT PAIN NOTED PAIN PRSNT: CPT | Performed by: SURGERY

## 2024-08-12 PROCEDURE — 1036F TOBACCO NON-USER: CPT | Performed by: SURGERY

## 2024-08-12 PROCEDURE — 99205 OFFICE O/P NEW HI 60 MIN: CPT | Performed by: SURGERY

## 2024-08-12 PROCEDURE — 99215 OFFICE O/P EST HI 40 MIN: CPT | Performed by: SURGERY

## 2024-08-12 ASSESSMENT — PATIENT HEALTH QUESTIONNAIRE - PHQ9
1. LITTLE INTEREST OR PLEASURE IN DOING THINGS: NOT AT ALL
SUM OF ALL RESPONSES TO PHQ9 QUESTIONS 1 AND 2: 0
2. FEELING DOWN, DEPRESSED OR HOPELESS: NOT AT ALL

## 2024-08-12 ASSESSMENT — LIFESTYLE VARIABLES
HOW MANY STANDARD DRINKS CONTAINING ALCOHOL DO YOU HAVE ON A TYPICAL DAY: PATIENT DOES NOT DRINK
SKIP TO QUESTIONS 9-10: 1
AUDIT-C TOTAL SCORE: 0
HOW OFTEN DO YOU HAVE A DRINK CONTAINING ALCOHOL: NEVER
HOW OFTEN DO YOU HAVE SIX OR MORE DRINKS ON ONE OCCASION: NEVER

## 2024-08-12 ASSESSMENT — PAIN SCALES - GENERAL: PAINLEVEL: 8

## 2024-08-12 ASSESSMENT — ENCOUNTER SYMPTOMS
OCCASIONAL FEELINGS OF UNSTEADINESS: 1
DEPRESSION: 0
LOSS OF SENSATION IN FEET: 1

## 2024-08-12 ASSESSMENT — VISUAL ACUITY: OU: 1

## 2024-08-12 NOTE — PROGRESS NOTES
Vascular Surgery Consultation, History, Physical    Magdiel Richardson is a 79 y.o. year old male patient.   History: Patient comes referred for aortic and iliac artery aneurysms found on CT scan recently.  CT which I reviewed is a CT abdomen pelvis which demonstrates a mildly ectatic abdominal aorta that measures 3.1 cm in maximal diameter but without actual aneurysmal degeneration collecting a larger gauge of blood vessel.  The iliac arteries arm mildly aneurysmal to maximum 2.7 cm.  There is atherosclerosis in the right common iliac artery without a significant stenosis.  He is asymptomatic.  His current problems are of severe spinal stenosis and back pain and sciatica for which he is to undergo surgery.  He is a  and is quite active and currently working.  He does not smoke.  He has no family history of aortic aneurysm.    I had a discussion with him regarding the management of these aneurysms.  At this size we do not operate but we do bring him back periodically for surveillance.  Going to schedule him for 6-month follow-up duplex of the aorta and iliac arteries.  He is cleared to proceed with spine surgery.    History reviewed. No pertinent past medical history.    Past Surgical History:   Procedure Laterality Date    FRACTURE SURGERY      OTHER SURGICAL HISTORY  08/19/2016    History Of Prior Surgery    PROSTATE SURGERY  08/19/2016    Prostate Surgery       Active Ambulatory Problems     Diagnosis Date Noted    Acute lumbar radiculopathy 06/02/2023    Alzheimer disease (Multi) 06/02/2023    Bilateral sciatica 06/02/2023    Bradycardia 06/02/2023    Chronic right shoulder pain 06/02/2023    Cigarette nicotine dependence in remission 06/02/2023    Degenerative joint disease of shoulder region 06/02/2023    Difficulty walking 06/02/2023    Eczema 06/02/2023    Hammer toe 06/02/2023    Toe anomaly 06/02/2023    Hyperlipemia 06/02/2023    Impaired cognition 06/02/2023    MCI (mild cognitive  impairment) 06/02/2023    Memory impairment 06/02/2023    Arthralgia of left knee 06/02/2023    Knee pain 06/02/2023    Muscle weakness (generalized) 06/02/2023    Apnea, sleep 06/02/2023    JAILENE (obstructive sleep apnea) 06/02/2023    Osteoarthritis of spine with radiculopathy, lumbosacral region 06/02/2023    Patellar tendon rupture, left, subsequent encounter 06/02/2023    Right shoulder tendinitis 06/02/2023    Stiff back 06/02/2023    Strain of left quadriceps muscle, fascia and tendon, subsequent encounter 06/02/2023    Toe deformity, left 06/02/2023    Traumatic rupture of patella, left, initial encounter 06/02/2023    Tubulovillous adenoma polyp of colon 06/02/2023    Viral wart, unspecified 06/02/2023    Class 1 obesity 05/04/2015    History of malignant neoplasm of prostate 09/13/2006    Diverticulosis of colon 11/09/2011    Impotence of organic origin 12/26/2006    Internal hemorrhoids 11/09/2011    Periodic limb movement disorder 09/19/2014    Ulnar neuropathy of right upper extremity 03/03/2015    Ulnar neuropathy 03/03/2015    Right sided sciatica 02/19/2015    Sciatica 02/19/2015    Obesity with body mass index greater than 30 05/04/2015     Resolved Ambulatory Problems     Diagnosis Date Noted    Malignant neoplasm of prostate (Multi) 06/02/2023    Sinoatrial node dysfunction (Multi) 09/15/2011    Pulmonary embolism, unspecified chronicity, unspecified pulmonary embolism type, unspecified whether acute cor pulmonale present (Multi) 08/05/2024     No Additional Past Medical History       Review of Systems   Constitutional: Negative.    HENT: Negative.     Eyes: Negative.    Respiratory: Negative.     Cardiovascular: Negative.    Gastrointestinal: Negative.    Endocrine: Negative.    Genitourinary: Negative.    Musculoskeletal: Negative.    Skin: Negative.    Allergic/Immunologic: Negative.    Neurological: Negative.    Hematological: Negative.    Psychiatric/Behavioral: Negative.       No Known  Allergies    Vitals:   Visit Vitals  /82   Pulse 63   Resp 18     Physical Exam:   Vascular Physical Exam  Eyes:      General: Vision grossly intact.      Pupils: Pupils are equal, round, and reactive to light.   Cardiovascular:      Rate and Rhythm: Normal rate.      Pulses:           Radial pulses are 2+ on the right side and 2+ on the left side.        Femoral pulses are 2+ on the right side and 2+ on the left side.  Pulmonary:      Effort: Pulmonary effort is normal.   Abdominal:      General: Abdomen is protuberant.      Palpations: Abdomen is soft.      Tenderness: There is no abdominal tenderness.   Musculoskeletal:         General: Normal range of motion.   Skin:     General: Skin is dry.   Neurological:      General: No focal deficit present.   Psychiatric:         Mood and Affect: Mood normal.         Labs:  LABS:  CBC with Differential:    Lab Results   Component Value Date    WBC 4.9 08/06/2024    RBC 4.46 (L) 08/06/2024    HGB 14.6 08/06/2024    HCT 42.0 08/06/2024     08/06/2024    MCV 94 08/06/2024    MCH 32.7 08/06/2024    MCHC 34.8 08/06/2024    RDW 12.1 08/06/2024    NRBC 0.0 08/06/2024    LYMPHOPCT 36.7 08/05/2024    MONOPCT 7.3 08/05/2024    EOSPCT 1.3 08/05/2024    BASOPCT 0.7 08/05/2024    MONOSABS 0.39 08/05/2024    LYMPHSABS 1.97 08/05/2024    EOSABS 0.07 08/05/2024    BASOSABS 0.04 08/05/2024     CMP:    Lab Results   Component Value Date     (L) 08/06/2024    K 3.9 08/06/2024     08/06/2024    CO2 27 08/06/2024    BUN 14 08/06/2024    CREATININE 0.91 08/06/2024    GLUCOSE 101 (H) 08/06/2024    PROT 7.8 08/05/2024    CALCIUM 9.9 08/06/2024    BILITOT 0.8 08/05/2024    ALKPHOS 77 08/05/2024    AST 16 08/05/2024    ALT 25 08/05/2024     BMP:    Lab Results   Component Value Date     (L) 08/06/2024    K 3.9 08/06/2024     08/06/2024    CO2 27 08/06/2024    BUN 14 08/06/2024    CREATININE 0.91 08/06/2024    CALCIUM 9.9 08/06/2024    GLUCOSE 101 (H) 08/06/2024  "    Magnesium:No results found for: \"MG\"  Troponin:    Lab Results   Component Value Date    TROPHS 20 08/06/2024    TROPHS 15 08/05/2024     BNP: No results found for: \"BNP\"    Lab Results   Component Value Date    INR 1.1 08/05/2024    PROTIME 12.7 08/05/2024    CHOL 221 (H) 07/31/2024    LDLF 105 (H) 06/02/2023    HDL 53.6 07/31/2024       Imaging: reviewed      Assessment/Plan   Diagnoses and all orders for this visit:  Infrarenal abdominal aortic aneurysm (AAA) without rupture (CMS-[facility])  -     Vascular US aorta iliac duplex complete; Future  Iliac aneurysm (CMS-[facility])  -     Vascular US aorta iliac duplex complete; Future  PAD (peripheral artery disease) (CMS-[facility])   6 mo followup duplex of aneurysms    "

## 2024-08-16 ENCOUNTER — TELEPHONE (OUTPATIENT)
Dept: PAIN MEDICINE | Facility: CLINIC | Age: 79
End: 2024-08-16
Payer: MEDICARE

## 2024-08-16 NOTE — TELEPHONE ENCOUNTER
Patients wife called stating her  is in a great amount of pain.  He has been in bed since Wednesday, everytime he tries to get up he screams in pain.  His pain score is a 8-9/10, pain is across the low back, radiates down the left lower extremity, and his right foot tingles.  The diclofenec and gabapentin are not helping.  He is scheduled to see neurosurgeon, Dr. Vince Coe MD on 8/20/24.  Patients wife does not feel he can go all weekend like this.  She was notified Dr. Allen is out of office until Tuesday.

## 2024-08-16 NOTE — TELEPHONE ENCOUNTER
Called the patient to let him know Dr. Mukherjee advises the patient if the pain is that bad he should go to the nearest ED.  Reached the patients voice mail and was unable to leave a message due to the mailbox being full.

## 2024-08-19 ENCOUNTER — PATIENT OUTREACH (OUTPATIENT)
Dept: CARE COORDINATION | Facility: CLINIC | Age: 79
End: 2024-08-19
Payer: MEDICARE

## 2024-08-19 NOTE — PROGRESS NOTES
Call regarding appt. with PCP on 08/09/24 after hospitalization.  At time of outreach call the patient feels as if their condition has improved  a little he is still have some pain in his leg  since last visit.  Reviewed the PCP appointment with the pt and addressed any questions or concerns.

## 2024-08-19 NOTE — PROGRESS NOTES
It was a pleasure to see Mr. Richardson at the [facility]surgery [facility] at [facility].     He is a really nice 79 y.o. male  who presents to us with complaints LOW BACK PAIN radiation to LEFT LEG  that started about  1 month ago, and have been gradually worsening since that time.  The symptoms started after no known injury    The pain is 6 /10. The pain is described as stabbing and occurs intermittently.  Symptoms are exacerbated by walking for more than a few minutes. Factors which relieve the pain include  massage therapy.       Numbness and/or tingling - YES- tingling bilateral legs     Weakness : YES- bilateral legs     Bladder/Bowel symptoms - NO    The patient has tried medications (eg: gabapentin, NSAIDS and narcotics ) : Yes- gabapentin   PT : No  Pain Management with ESIs/selective nerve blocks  - NO    he is a NON-SMOKER and NON-DIABETIC    History of Depression : NO    PRIOR SPINE SURGERY: NO    Denies use of Aspirin, Coumadin, or Plavix or any other anticoagulants     NARCOTICS for pain : NO    Part of this patient’s history is from personal review of the patient’s previous charts.      No past medical history on file.        Current Outpatient Medications:     dexAMETHasone (Decadron) 2 mg tablet, Take 5 tablets (10 mg) by mouth once daily for 1 day, THEN 4 tablets (8 mg) once daily for 2 days, THEN 3 tablets (6 mg) once daily for 2 days, THEN 2 tablets (4 mg) once daily for 2 days, THEN 1 tablet (2 mg) once daily for 2 days. Do not fill before August 7, 2024., Disp: 25 tablet, Rfl: 0    diclofenac (Voltaren) 75 mg EC tablet, Take 1 tablet (75 mg) by mouth 2 times a day as needed (back pain). Do not crush, chew, or split., Disp: 10 tablet, Rfl: 0    gabapentin (Neurontin) 300 mg capsule, Take 2 capsules (600 mg) by mouth once daily at bedtime. (Patient not taking: Reported on 8/12/2024), Disp: 30 capsule, Rfl: 0    gabapentin (Neurontin) 300 mg capsule, Take 1 capsule (300 mg) by mouth 2 times  a day., Disp: 60 capsule, Rfl: 0    memantine (Namenda) 10 mg tablet, Take 1 tablet (10 mg) by mouth 2 times a day., Disp: 180 tablet, Rfl: 2    polyethylene glycol (Glycolax, Miralax) 17 gram packet, Take 17 g by mouth once daily., Disp: 20 packet, Rfl: 0      Review of Systems :         EXAM:   There were no vitals filed for this visit.      IMAGING:   ***    ASSESSMENT AND PLAN:  ***        Vince Coe MD, [facility]   of Neurological Surgery  [employer]  Attending Surgeon  Director - Minimally Invasive Spine Surgery  [place], [place]      Some of this note was completed using Dragon voice recognition technology and sometimes the software misinterprets words. This may include unintended errors with respect to translation of words, typographical errors or grammar errors which may not have been identified prior to finalization of the chart note. Please take this into account when reading this note

## 2024-08-20 ENCOUNTER — APPOINTMENT (OUTPATIENT)
Dept: VASCULAR SURGERY | Facility: CLINIC | Age: 79
End: 2024-08-20
Payer: MEDICARE

## 2024-08-20 ENCOUNTER — HOSPITAL ENCOUNTER (EMERGENCY)
Facility: HOSPITAL | Age: 79
Discharge: HOME | End: 2024-08-20
Attending: INTERNAL MEDICINE
Payer: MEDICARE

## 2024-08-20 ENCOUNTER — APPOINTMENT (OUTPATIENT)
Dept: NEUROSURGERY | Facility: CLINIC | Age: 79
End: 2024-08-20
Payer: MEDICARE

## 2024-08-20 VITALS
TEMPERATURE: 98.6 F | HEART RATE: 67 BPM | SYSTOLIC BLOOD PRESSURE: 175 MMHG | BODY MASS INDEX: 28.77 KG/M2 | WEIGHT: 201 LBS | OXYGEN SATURATION: 99 % | DIASTOLIC BLOOD PRESSURE: 85 MMHG | RESPIRATION RATE: 18 BRPM | HEIGHT: 70 IN

## 2024-08-20 DIAGNOSIS — M54.32 SCIATICA OF LEFT SIDE: ICD-10-CM

## 2024-08-20 DIAGNOSIS — M54.16 LUMBAR RADICULOPATHY: Primary | ICD-10-CM

## 2024-08-20 PROCEDURE — 2500000001 HC RX 250 WO HCPCS SELF ADMINISTERED DRUGS (ALT 637 FOR MEDICARE OP): Performed by: INTERNAL MEDICINE

## 2024-08-20 PROCEDURE — 99283 EMERGENCY DEPT VISIT LOW MDM: CPT

## 2024-08-20 PROCEDURE — 2500000005 HC RX 250 GENERAL PHARMACY W/O HCPCS: Performed by: INTERNAL MEDICINE

## 2024-08-20 PROCEDURE — 96372 THER/PROPH/DIAG INJ SC/IM: CPT | Performed by: INTERNAL MEDICINE

## 2024-08-20 PROCEDURE — 2500000004 HC RX 250 GENERAL PHARMACY W/ HCPCS (ALT 636 FOR OP/ED): Performed by: INTERNAL MEDICINE

## 2024-08-20 RX ORDER — DEXAMETHASONE 2 MG/1
TABLET ORAL
Qty: 25 TABLET | Refills: 0 | Status: SHIPPED | OUTPATIENT
Start: 2024-08-20 | End: 2024-08-28

## 2024-08-20 RX ORDER — LIDOCAINE 560 MG/1
1 PATCH PERCUTANEOUS; TOPICAL; TRANSDERMAL ONCE
Status: DISCONTINUED | OUTPATIENT
Start: 2024-08-20 | End: 2024-08-20 | Stop reason: HOSPADM

## 2024-08-20 RX ORDER — ACETAMINOPHEN 325 MG/1
650 TABLET ORAL ONCE
Status: COMPLETED | OUTPATIENT
Start: 2024-08-20 | End: 2024-08-20

## 2024-08-20 RX ORDER — IBUPROFEN 800 MG/1
800 TABLET ORAL EVERY 6 HOURS PRN
Qty: 28 TABLET | Refills: 0 | Status: SHIPPED | OUTPATIENT
Start: 2024-08-20 | End: 2024-08-27

## 2024-08-20 RX ORDER — ACETAMINOPHEN 325 MG/1
650 TABLET ORAL EVERY 6 HOURS PRN
Qty: 30 TABLET | Refills: 0 | Status: SHIPPED | OUTPATIENT
Start: 2024-08-20

## 2024-08-20 RX ORDER — KETOROLAC TROMETHAMINE 30 MG/ML
30 INJECTION, SOLUTION INTRAMUSCULAR; INTRAVENOUS ONCE
Status: COMPLETED | OUTPATIENT
Start: 2024-08-20 | End: 2024-08-20

## 2024-08-20 RX ORDER — DEXAMETHASONE SODIUM PHOSPHATE 10 MG/ML
10 INJECTION INTRAMUSCULAR; INTRAVENOUS ONCE
Status: COMPLETED | OUTPATIENT
Start: 2024-08-20 | End: 2024-08-20

## 2024-08-20 RX ORDER — ORPHENADRINE CITRATE 30 MG/ML
60 INJECTION INTRAMUSCULAR; INTRAVENOUS ONCE
Status: DISCONTINUED | OUTPATIENT
Start: 2024-08-20 | End: 2024-08-20

## 2024-08-20 RX ORDER — CYCLOBENZAPRINE HCL 10 MG
10 TABLET ORAL 3 TIMES DAILY PRN
Qty: 15 TABLET | Refills: 0 | Status: SHIPPED | OUTPATIENT
Start: 2024-08-20 | End: 2024-08-25

## 2024-08-20 RX ORDER — LIDOCAINE 50 MG/G
1 PATCH TOPICAL DAILY
Qty: 7 PATCH | Refills: 0 | Status: SHIPPED | OUTPATIENT
Start: 2024-08-20

## 2024-08-20 RX ORDER — OXYCODONE HYDROCHLORIDE 5 MG/1
5 TABLET ORAL EVERY 6 HOURS PRN
Qty: 12 TABLET | Refills: 0 | Status: SHIPPED | OUTPATIENT
Start: 2024-08-20 | End: 2024-08-23

## 2024-08-20 RX ORDER — CYCLOBENZAPRINE HCL 10 MG
10 TABLET ORAL ONCE
Status: COMPLETED | OUTPATIENT
Start: 2024-08-20 | End: 2024-08-20

## 2024-08-20 ASSESSMENT — PAIN DESCRIPTION - DIRECTION
RADIATING_TOWARDS: FOOT
RADIATING_TOWARDS: FOOT

## 2024-08-20 ASSESSMENT — PAIN SCALES - GENERAL
PAINLEVEL_OUTOF10: 6
PAINLEVEL_OUTOF10: 5 - MODERATE PAIN
PAINLEVEL_OUTOF10: 3
PAINLEVEL_OUTOF10: 7

## 2024-08-20 ASSESSMENT — PAIN DESCRIPTION - ORIENTATION
ORIENTATION: LEFT

## 2024-08-20 ASSESSMENT — PAIN - FUNCTIONAL ASSESSMENT
PAIN_FUNCTIONAL_ASSESSMENT: 0-10

## 2024-08-20 ASSESSMENT — COLUMBIA-SUICIDE SEVERITY RATING SCALE - C-SSRS
2. HAVE YOU ACTUALLY HAD ANY THOUGHTS OF KILLING YOURSELF?: NO
1. IN THE PAST MONTH, HAVE YOU WISHED YOU WERE DEAD OR WISHED YOU COULD GO TO SLEEP AND NOT WAKE UP?: NO
6. HAVE YOU EVER DONE ANYTHING, STARTED TO DO ANYTHING, OR PREPARED TO DO ANYTHING TO END YOUR LIFE?: NO

## 2024-08-20 ASSESSMENT — PAIN DESCRIPTION - LOCATION
LOCATION: LEG

## 2024-08-20 ASSESSMENT — PAIN DESCRIPTION - FREQUENCY
FREQUENCY: INTERMITTENT
FREQUENCY: CONSTANT/CONTINUOUS

## 2024-08-20 ASSESSMENT — PAIN DESCRIPTION - PAIN TYPE
TYPE: ACUTE PAIN;CHRONIC PAIN
TYPE: CHRONIC PAIN
TYPE: CHRONIC PAIN;ACUTE PAIN
TYPE: ACUTE PAIN;CHRONIC PAIN

## 2024-08-20 ASSESSMENT — PAIN DESCRIPTION - PROGRESSION
CLINICAL_PROGRESSION: NOT CHANGED
CLINICAL_PROGRESSION: GRADUALLY IMPROVING

## 2024-08-20 ASSESSMENT — PAIN DESCRIPTION - DESCRIPTORS: DESCRIPTORS: BURNING

## 2024-08-20 NOTE — ED PROVIDER NOTES
"HPI     CC: Leg Pain (Sciatic leg pain)     HPI: Magdiel Richardson is a 79 y.o. male with a history of prostate CA, Alzheimer's, sciatica, final stenosis, recent admission for sciatica and segmental PE with incidentally found abdominal aortic aneurysm and bilateral common iliac aneurysms, presents with left hip and leg pain.  Patient states that symptoms are exactly the same as his prior sciatica pain.  He describes pain in the left hip that radiates down the leg.  He denies any new weakness, numbness/tingling, bowel or bladder changes.  He states that he had an appointment today with pain management at 9 AM but could not make it due to severe pain so he called 911.  They could not bring him to his appointment so they brought him to the ED.  He has received injections in the past that have helped.  He states he is taking Percocet and \"something else\" but he is not sure the name of the medication he takes for pain.  He had a CT of his lumbar spine done 7/25 which showed severe canal stenosis at L4-L5 with severe left neural foramen narrowing at L3-L5. Per recent DC summary from 8/6, he was discharged on a steroid taper, oxycodone, Voltaren, and gabapentin.  On review of EMR, it actually looks like patient had an appointment with neurosurgery today at 9:20 AM.  He has been calling pain management in the past week but they stated if he had severe pain he should present to the ED.  Patient was unaware of a neurosurgery appointment or any follow-up for surgical evaluation.    ROS: 10-point review of systems was performed and is otherwise negative except as noted in HPI.    Limitations to history: N/A    Independent Historians: N/A    External Records Reviewed: Outpatient notes in EMR, recent DC summary from 8/6    Past Medical History: Noncontributory except per HPI     Past Surgical History: Noncontributory except per HPI     Family History: Reviewed and noncontributory     Social History:  Denies tobacco. Denies ETOH. " "Denies illicit drugs.    Social Determinants Affecting Care: N/A    No Known Allergies    Home Meds:   Current Outpatient Medications   Medication Instructions    acetaminophen (TYLENOL) 650 mg, oral, Every 6 hours PRN    cyclobenzaprine (FLEXERIL) 10 mg, oral, 3 times daily PRN    dexAMETHasone (Decadron) 2 mg tablet Take 5 tablets (10 mg) by mouth once daily for 1 day, THEN 4 tablets (8 mg) once daily for 2 days, THEN 3 tablets (6 mg) once daily for 2 days, THEN 2 tablets (4 mg) once daily for 2 days, THEN 1 tablet (2 mg) once daily for 2 days.    gabapentin (NEURONTIN) 600 mg, oral, Nightly    gabapentin (NEURONTIN) 300 mg, oral, 2 times daily    ibuprofen 800 mg, oral, Every 6 hours PRN    lidocaine (Lidoderm) 5 % patch 1 patch, transdermal, Daily, Remove & discard patch within 12 hours or as directed by MD.    memantine (NAMENDA) 10 mg, oral, 2 times daily    oxyCODONE (ROXICODONE) 5 mg, oral, Every 6 hours PRN    polyethylene glycol (GLYCOLAX, MIRALAX) 17 g, oral, Daily        Physical Exam     ED Triage Vitals [08/20/24 0916]   Temperature Heart Rate Respirations BP   37 °C (98.6 °F) 61 16 (!) 154/91      Pulse Ox Temp src Heart Rate Source Patient Position   99 % -- -- --      BP Location FiO2 (%)     Right arm --         Heart Rate:  [61-65]   Temperature:  [37 °C (98.6 °F)]   Respirations:  [16]   BP: (151-154)/(89-91)   Height:  [177.8 cm (5' 10\")]   Weight:  [91.2 kg (201 lb)]   Pulse Ox:  [99 %]      Physical Exam  Vitals and nursing note reviewed.     CONSTITUTIONAL: Well appearing, well nourished, in no acute distress.   HENMT: Head atraumatic. Airway patent. Nasal mucosa clear. Mouth with normal mucosa, clear oropharynx. Uvula midline. Neck supple.    EYES: Clear bilaterally, pupils equally round and reactive to light.   CARDIOVASCULAR: Normal rate, regular rhythm.  Heart sounds S1, S2.  No murmurs, rubs or gallops. Normal pulses. Capillary refill < 2 sec.   RESPIRATORY: No increased work of " breathing. Breath sounds clear and equal bilaterally.  GASTROINTESTINAL: Abdomen soft, non-distended, non-tender. No rebound, no guarding. Normal bowel sounds. No palpable masses.  GENITOURINARY:  No CVA tenderness.  MUSCULOSKELETAL:  No midline C/T/L TTP or stepoffs. No other muscle or joint deformity or TTP. No edema.  NEUROLOGICAL: Alert and oriented, no asymmetry, moving all extremities equally. 5/5 strength and SILT BLEs.   HSNE lumbar spine: Normal thigh abduction (L2), knee extension (L3), ankle and great toe dorsiflexion (L4/L5), patellar reflex (L2-L4),  Knee flexion (S1), toe plantarflexion (S2).  SKIN: Warm, dry and intact. No rash or notable lesions.  PSYCHIATRIC: Normal mood and affect.  HEME/LYMPH: No adenopathy or splenomegaly.    Diagnostic Results      ECG: ECGs read and interpreted by me. See ED Course, below, for interpretation.    Labs Reviewed - No data to display      No orders to display                 [place] Coma Scale Score: 15                  Procedure  Procedures    ED Course & MDM   Assessment/Plan:   Magdiel Richardson is a 79 y.o. male with a history of prostate CA, Alzheimer's, sciatica, final stenosis, recent admission for sciatica and segmental PE with incidentally found abdominal aortic aneurysm and bilateral common iliac aneurysms, presents with left hip and leg pain consistent with prior sciatica symptoms in the setting of severe lumbar spinal stenosis.  He missed his neurosurgical follow-up appointment today due to the pain.  He responded well in the past to steroids and Toradol.  He has no new neurologic deficits to suspect cauda equina syndrome.  No abdominal pain or other red flag symptoms to suspect ruptured aneurysm.  Will trial Decadron, Toradol, Flexeril, lidocaine patch, and Tylenol.  He can be discharged if he is able to ambulate without severe pain. See below for details of ED course and ultimate disposition.    Medications   lidocaine 4 % patch 1 patch (1 patch  transdermal Medication Applied 8/20/24 1037)   ketorolac (Toradol) injection 30 mg (30 mg intramuscular Given 8/20/24 1030)   dexAMETHasone (PF) (Decadron) injection 10 mg (10 mg intramuscular Given 8/20/24 1030)   acetaminophen (Tylenol) tablet 650 mg (650 mg oral Given 8/20/24 1030)   cyclobenzaprine (Flexeril) tablet 10 mg (10 mg oral Given 8/20/24 1030)        ED Course as of 08/20/24 2111   Tue Aug 20, 2024   1156 Patient is feeling better.  He ambulated to the bathroom with the RN.  Family would like to try to go to their neurosurgery appointment today.  They are not interested in rehab placement or admission just for pain control. I attempted to reach Dr. Coe via secure chat but he has not responded.  My  attempted to call their office and left a message.  I instructed patient to go directly there and see if they can fit him in today.  He was given a repeat course of steroids, we will switch his Voltaren to ibuprofen 800 mg, in addition to acetaminophen.  I  also gave him a prescription for Flexeril and Lidoderm patches.  Patient was discharged home with anticipatory guidance and strict return precautions. [CG]      ED Course User Index  [CG] Nichole Jack MD         Diagnoses as of 08/20/24 2111   Lumbar radiculopathy       Disposition:   DISCHARGE.  The patient was discharged with both verbal and written anticipatory guidance and strict return precautions. Discharge diagnosis, instructions and plan were discussed and understood. I emphasized the importance of following up with their primary care provider within 24-48 hours and with any referrals in the timeframe recommended. At the time of discharge the patient was comfortable and was in no apparent distress. Patient is aware of diagnostic uncertainty and was notified though testing is negative here, there is a very small chance that pathology may be missed.  The patient understands these risks and the patient/family understood to call  [phone] or return immediately to the emergency department if the symptoms worsen or if they have any additional concerns.      FOLLOW UP  Primary care provider in 1-2 days.      ED Prescriptions       Medication Sig Dispense Start Date End Date Auth. Provider    dexAMETHasone (Decadron) 2 mg tablet Take 5 tablets (10 mg) by mouth once daily for 1 day, THEN 4 tablets (8 mg) once daily for 2 days, THEN 3 tablets (6 mg) once daily for 2 days, THEN 2 tablets (4 mg) once daily for 2 days, THEN 1 tablet (2 mg) once daily for 2 days. 25 tablet 8/20/2024 8/28/2024 Nichole Jack MD    oxyCODONE (Roxicodone) 5 mg immediate release tablet Take 1 tablet (5 mg) by mouth every 6 hours if needed for severe pain (7 - 10) for up to 3 days. 12 tablet 8/20/2024 8/23/2024 Nichole Jack MD    ibuprofen 800 mg tablet Take 1 tablet (800 mg) by mouth every 6 hours if needed for mild pain (1 - 3) for up to 7 days. 28 tablet 8/20/2024 8/27/2024 Nichole Jack MD    cyclobenzaprine (Flexeril) 10 mg tablet Take 1 tablet (10 mg) by mouth 3 times a day as needed for muscle spasms for up to 5 days. 15 tablet 8/20/2024 8/25/2024 Nichole Jack MD    acetaminophen (Tylenol) 325 mg tablet Take 2 tablets (650 mg) by mouth every 6 hours if needed for mild pain (1 - 3). 30 tablet 8/20/2024 -- Nichole Jack MD    lidocaine (Lidoderm) 5 % patch Place 1 patch over 12 hours on the skin once daily. Remove & discard patch within 12 hours or as directed by MD. 7 patch 8/20/2024 -- MD Nichole Oakley MD  EM/IM/Peds    This note was dictated by speech recognition. Minor errors in transcription may be present.     Nichole Jack MD  08/20/24 [id number]

## 2024-08-20 NOTE — DISCHARGE INSTRUCTIONS
You were seen today for recurrent back pain.  We gave you a prescription for a muscle relaxant, a refill on your oxycodone and your steroid Dosepak. Stop the Voltaren and start ibuprofen 800 mg for the next week.  You can alternate this every 3 hours with tylenol. We also gave you a prescription for lidocaine patches. Follow-up with Dr. Coe neurosurgery and Dr. Allen pain management. Your evaluation was not concerning for an emergency at this time. Please see the attached information sheet for information about your condition, how to care for your condition at home, and reasons to return to the emergency department. Take any prescriptions written today as prescribed. You should call your primary care provider within 24 hours to tell them about today's visit, including any new medications or medication changes, as he or she may want to see you in the office for further evaluation. If you do not have a primary care provider, call  [phone] for an appointment. We offer in-person office visits as well as virtual options. Please do not hesitate to call  [phone] or return to the emergency department with any new or unresolved concerns or symptoms. Thank you for choosing [facility] for your care.

## 2024-08-20 NOTE — ED TRIAGE NOTES
Patient presents to ED with sciatic leg pain that has been going on for 3 weeks. Patient had an appt at 9am today with pain management for this condition. Patient called ems because pain was severe.

## 2024-08-22 DIAGNOSIS — Z82.0 FAMILY HISTORY OF ALZHEIMER'S DISEASE: ICD-10-CM

## 2024-08-23 ENCOUNTER — PREP FOR PROCEDURE (OUTPATIENT)
Dept: PAIN MEDICINE | Facility: CLINIC | Age: 79
End: 2024-08-23
Payer: MEDICARE

## 2024-08-23 DIAGNOSIS — M48.061 DEGENERATIVE LUMBAR SPINAL STENOSIS: ICD-10-CM

## 2024-08-23 DIAGNOSIS — M54.16 LUMBAR RADICULOPATHY: Primary | ICD-10-CM

## 2024-08-23 RX ORDER — SODIUM CHLORIDE, SODIUM LACTATE, POTASSIUM CHLORIDE, CALCIUM CHLORIDE 600; 310; 30; 20 MG/100ML; MG/100ML; MG/100ML; MG/100ML
20 INJECTION, SOLUTION INTRAVENOUS CONTINUOUS
OUTPATIENT
Start: 2024-08-23

## 2024-08-27 NOTE — PROGRESS NOTES
It was a pleasure to see Mr. Richardson at the [facility] at [facility].     He is a really nice 79 y.o. male  who presents to us with complaints left leg pain that started about  5 weeks ago, and have been gradually worsening since that time.  The symptoms started after no known injury    The pain is 10 /10. The pain is described as aching and stabbing and occurs intermittently.  Symptoms are exacerbated by nothing in particular. Factors which relieve the pain include  message and medis       Numbness and/or tingling - YES    Weakness : NO    Bladder/Bowel symptoms - NO    The patient has tried medications (eg: gabapentin, NSAIDS and narcotics ) : Yes  PT : No  Pain Management with ESIs/selective nerve blocks  - NO    he is a NON-SMOKER and NON-DIABETIC    History of Depression : NO    PRIOR SPINE SURGERY: NO    Denies use of Aspirin, Coumadin, or Plavix or any other anticoagulants     NARCOTICS for pain : YES    Part of this patient’s history is from personal review of the patient’s previous charts.      No past medical history on file.        Current Outpatient Medications:     acetaminophen (Tylenol) 325 mg tablet, Take 2 tablets (650 mg) by mouth every 6 hours if needed for mild pain (1 - 3)., Disp: 30 tablet, Rfl: 0    cyclobenzaprine (Flexeril) 10 mg tablet, Take 1 tablet (10 mg) by mouth 3 times a day as needed for muscle spasms for up to 5 days., Disp: 15 tablet, Rfl: 0    dexAMETHasone (Decadron) 2 mg tablet, Take 5 tablets (10 mg) by mouth once daily for 1 day, THEN 4 tablets (8 mg) once daily for 2 days, THEN 3 tablets (6 mg) once daily for 2 days, THEN 2 tablets (4 mg) once daily for 2 days, THEN 1 tablet (2 mg) once daily for 2 days., Disp: 25 tablet, Rfl: 0    gabapentin (Neurontin) 300 mg capsule, Take 2 capsules (600 mg) by mouth once daily at bedtime. (Patient not taking: Reported on 8/12/2024), Disp: 30 capsule, Rfl: 0    gabapentin (Neurontin) 300 mg capsule, Take 1 capsule  (300 mg) by mouth 2 times a day., Disp: 60 capsule, Rfl: 0    ibuprofen 800 mg tablet, Take 1 tablet (800 mg) by mouth every 6 hours if needed for mild pain (1 - 3) for up to 7 days., Disp: 28 tablet, Rfl: 0    lidocaine (Lidoderm) 5 % patch, Place 1 patch over 12 hours on the skin once daily. Remove & discard patch within 12 hours or as directed by MD., Disp: 7 patch, Rfl: 0    memantine (Namenda) 10 mg tablet, Take 1 tablet (10 mg) by mouth 2 times a day., Disp: 180 tablet, Rfl: 2    polyethylene glycol (Glycolax, Miralax) 17 gram packet, Take 17 g by mouth once daily., Disp: 20 packet, Rfl: 0      Review of Systems :   Constitutional: No fever or chills  Respiratory: No shortness of breath or wheezing  Musculoskeletal: see HPI above   Neurologic: See HPI above      EXAM:   There were no vitals filed for this visit.  NEURO: Neurologically patient is awake alert and oriented X 3    No obvious cranial nerve deficit.  Strength is almost 5/5 throughout all motor groups tested with no asymmetric significant motor deficit.  Deep tendon reflexes are symmetric throughout.   Gait : WNL   Sensory examination is intact to touch and painful stimuli.      IMAGING:   CT scan of the lumbar spine done on 7 25,024 was reviewed personally and shows presence of a grade 1 L4-5 degenerative spondylolisthesis with vacuum disc phenomenon at multiple levels but no evidence of any fracture.    ASSESSMENT AND PLAN:  Mr. Richardson has not tried any conservative treatment yet.   His neurological examination is completely benign with absence of any focal neurological deficits.  The duration of symptoms < 6 weeks and the patient does NOT have any symptoms or history of severe or progressive neurological deficit (eg, bowel or bladder function, saddle parasthesia), fever, trauma, sudden back pain with spinal tenderness (especially with history of osteoporosis, cancer, or steroid use), or history of cancer and there is no urgent indication to  obtain any advanced imaging. I have given him a referral for PT. He has already seen pain management in the past for similar reasons and they can follow-up with the same physicians for that.  I will be happy to see him back if he continues to be symptomatic despite all conservative treatment to obtain advanced imaging.          Vince Coe MD, [facility]   of Neurological Surgery  [facility] [employer]  Attending Surgeon  Director - Minimally Invasive Spine Surgery  [place], [place]      Some of this note was completed using Dragon voice recognition technology and sometimes the software misinterprets words. This may include unintended errors with respect to translation of words, typographical errors or grammar errors which may not have been identified prior to finalization of the chart note. Please take this into account when reading this note

## 2024-08-29 ENCOUNTER — TELEPHONE (OUTPATIENT)
Dept: PAIN MEDICINE | Facility: CLINIC | Age: 79
End: 2024-08-29

## 2024-08-29 ENCOUNTER — OFFICE VISIT (OUTPATIENT)
Dept: NEUROSURGERY | Facility: CLINIC | Age: 79
End: 2024-08-29
Payer: MEDICARE

## 2024-08-29 VITALS
SYSTOLIC BLOOD PRESSURE: 155 MMHG | BODY MASS INDEX: 27.72 KG/M2 | RESPIRATION RATE: 20 BRPM | HEART RATE: 47 BPM | HEIGHT: 71 IN | DIASTOLIC BLOOD PRESSURE: 83 MMHG | WEIGHT: 198 LBS

## 2024-08-29 DIAGNOSIS — M79.605 PAIN OF LEFT LOWER EXTREMITY: ICD-10-CM

## 2024-08-29 PROCEDURE — 1126F AMNT PAIN NOTED NONE PRSNT: CPT | Performed by: NEUROLOGICAL SURGERY

## 2024-08-29 PROCEDURE — 99203 OFFICE O/P NEW LOW 30 MIN: CPT | Performed by: NEUROLOGICAL SURGERY

## 2024-08-29 PROCEDURE — 1036F TOBACCO NON-USER: CPT | Performed by: NEUROLOGICAL SURGERY

## 2024-08-29 PROCEDURE — 1159F MED LIST DOCD IN RCRD: CPT | Performed by: NEUROLOGICAL SURGERY

## 2024-08-29 PROCEDURE — 99213 OFFICE O/P EST LOW 20 MIN: CPT | Performed by: NEUROLOGICAL SURGERY

## 2024-08-29 ASSESSMENT — PAIN SCALES - GENERAL: PAINLEVEL: 0-NO PAIN

## 2024-09-03 ENCOUNTER — PATIENT OUTREACH (OUTPATIENT)
Dept: PRIMARY CARE | Facility: CLINIC | Age: 79
End: 2024-09-03
Payer: MEDICARE

## 2024-09-04 RX ORDER — MEMANTINE HYDROCHLORIDE 10 MG/1
10 TABLET ORAL 2 TIMES DAILY
Qty: 180 TABLET | Refills: 3 | Status: SHIPPED | OUTPATIENT
Start: 2024-09-04 | End: 2025-09-04

## 2024-09-04 NOTE — PROGRESS NOTES
"[facility] Pain Management  Follow Up Office Visit Note 2024    Patient Information: Magdiel Richardson, MRN: [id number], : 1945   Primary Care/Referring Physician: Flako Miguel MD, [address] [address] / [facility] [id number]     Chief Complaint: Left low back and leg    Interval History: Mr. Richardson presents today for follow up. At his last visit I started Gabapentin, Diclofenac, and ordered a lumbar RADHA at L5/S1    Today he reports no changes since last seen. He continues to have fairly severe left leg pain. He did not schedule the RADHA and there appears to have been some difficulty contacting him. They are frustrated by his continued, debilitating pain. He met with a surgeon with no plans for surgery    Brief History of Pain: Mr. Magdiel Richardson is a 79 y.o. male with a PMHx of HLD, hx of prostate cancer who presents for left low back and leg pain.    He reports onset of pain around 1 week ago with no obvious inciting event. He states that his back started \"tightening up\" at night, which gradually worsened until he decided to go to the ED and have it evaluated. This has progressed to an intermittent stabbing pain which starts in the left low back with radiation to his left lateral thigh and the right medial calf. He feels some tingling down in his left calf and also in his bilateral feet. He denies any weakness. He has difficulty identifying trigger factors. He had a lumbar spine CT in the ED showing both central and foraminal stenosis at multiple levels, worse at L4/5    Current Pain Medications: Gabapentin 300 mg TID, Diclofenac 75 mg BID  Previously Tried Pain Medications: Medrol dosepak - minimal relief, Methocarbamol - minimal relief, IM Toradol - no benefit    Relevant Surgeries: Hx of bilateral TKA. Denies lumbar spine or hip surgeries  Injections: Denies lumbar spine injections   Physical/Occupational Therapy: No recent PT    Medications:   Current Outpatient Medications   Medication " Instructions    acetaminophen (TYLENOL) 650 mg, oral, Every 6 hours PRN    cyclobenzaprine (FLEXERIL) 10 mg, oral, 3 times daily PRN    dexAMETHasone (Decadron) 2 mg tablet Take 5 tablets (10 mg) by mouth once daily for 1 day, THEN 4 tablets (8 mg) once daily for 2 days, THEN 3 tablets (6 mg) once daily for 2 days, THEN 2 tablets (4 mg) once daily for 2 days, THEN 1 tablet (2 mg) once daily for 2 days.    gabapentin (NEURONTIN) 600 mg, oral, Nightly    gabapentin (NEURONTIN) 300 mg, oral, 2 times daily    lidocaine (Lidoderm) 5 % patch 1 patch, transdermal, Daily, Remove & discard patch within 12 hours or as directed by MD.    memantine (NAMENDA) 10 mg, oral, 2 times daily    oxyCODONE (Roxicodone) 5 mg immediate release tablet oral    polyethylene glycol (GLYCOLAX, MIRALAX) 17 g, oral, Daily      Allergies: No Known Allergies    Past Medical & Surgical History:  Past Medical History:   Diagnosis Date    Prostate CA (Multi)       Past Surgical History:   Procedure Laterality Date    FRACTURE SURGERY      OTHER SURGICAL HISTORY  08/19/2016    History Of Prior Surgery    PROSTATE SURGERY  08/19/2016    Prostate Surgery       Family History   Problem Relation Name Age of Onset    Other (htn) Mother      Diabetes Mother      Diabetes Father      Other (htn) Father       Social History     Socioeconomic History    Marital status:      Spouse name: Not on file    Number of children: Not on file    Years of education: Not on file    Highest education level: Not on file   Occupational History    Not on file   Tobacco Use    Smoking status: Never    Smokeless tobacco: Never   Vaping Use    Vaping status: Never Used   Substance and Sexual Activity    Alcohol use: Never    Drug use: Never    Sexual activity: Not on file   Other Topics Concern    Not on file   Social History Narrative    Not on file     Social Determinants of Health     Financial Resource Strain: Low Risk  (8/6/2024)    Overall Financial Resource Strain  "(CARDIA)     Difficulty of Paying Living Expenses: Not hard at all   Food Insecurity: Not on file   Transportation Needs: No Transportation Needs (8/6/2024)    PRAPARE - Transportation     Lack of Transportation (Medical): No     Lack of Transportation (Non-Medical): No   Physical Activity: Not on file   Stress: Not on file   Social Connections: Not on file   Intimate Partner Violence: Not on file   Housing Stability: High Risk (8/6/2024)    Housing Stability Vital Sign     Unable to Pay for Housing in the Last Year: Yes     Number of Times Moved in the Last Year: 1     Homeless in the Last Year: Yes       Problems, Past medical history, past surgical history, Medications, allergies, social and family history reviewed and as per the electronic medical record from today's encounter    Review of Systems:  CONST: No fever, chills, fatigue, weight changes  EYES: No loss of vision  ENT: No hearing loss, tinnitus  CV: No chest pain, palpitations  RESP: No dyspnea, shortness of breath, cough  GI: No stool incontinence, nausea, vomiting  : No urinary incontinence  MSK: No joint swelling  SKIN: No rash, no hives  NEURO: No headache, dizziness  PSYCH: No anxiety, depression  HEM/LYMPH: No easy bruising or bleeding  All other systems reviewed are negative     Physical Exam:  Vitals: /90   Pulse 57   Resp 18   Ht 1.803 m (5' 11\")   Wt 89.8 kg (198 lb)   SpO2 97%   BMI 27.62 kg/m²   General: No apparent distress. Alert, appropriate, oriented x 3. Mood and affect normal. Speaking in full sentences.  HENT: Normocephalic, atraumatic. Hearing intact.  Eyes: Extraocular movements grossly intact. Pupils equal and round.   Neck: Supple, trachea midline.  Lungs: Symmetric respiratory excursion on visual exam, nonlabored breathing.  Extremities: No clubbing, cyanosis, or edema noted in arms or legs.  Skin: No rashes, lesions, alopecia noted on back or extremities.   Neuro: Alert and appropriate. Using a wheelchair. Bulk and " tone within normal limits.    Laboratory Data:  The following laboratory data were reviewed during this visit:   Lab Results   Component Value Date    WBC 4.9 08/06/2024    RBC 4.46 (L) 08/06/2024    HGB 14.6 08/06/2024    HCT 42.0 08/06/2024     08/06/2024      Lab Results   Component Value Date    INR 1.1 08/05/2024     Lab Results   Component Value Date    CREATININE 0.91 08/06/2024    HGBA1C 5.4 07/31/2024       Imaging:  The following imaging impressions were reviewed by me during this visit:    -7/25/24 lumbar spine CT shows no evidence of compression fracture. Multilevel degenerative changes of the lumbar spine with severe disc height loss at L2-3, L3-L4, L4-L5, and L5-S1.  Multilevel lumbar central canal stenosis which is severe at L4-L5.  Multilevel mild to moderate osseous neural foraminal narrowing.  Severe left osseous neural foraminal narrowing at L3-L4 and severe bilateral osseous neural foraminal narrowing at L4-L5.  Multilevel facet degenerative changes most significant at L4-L5      I also personally reviewed the images from the above studies myself. These images and my interpretation of them contributed to the management and decision making of the patient's medical plan.    ASSESSMENT:  Mr. Magdiel Richardson is a 79 y.o. male with left low back and leg pain that is consistent with:    1. Lumbar radiculopathy          PLAN:    Diagnostics:   - I reviewed the report his recent lumbar spine MRI (see above for details) which shows severe canal stenosis at L4/5 in the setting of anterolisthesis of L4 on L5, with moderate to severe foraminal stenosis at L3/4 through L5/S1, worse on the left.    Physical Therapy and Rehabilitation:     - I believe his pain is currently too severe to meaningfully participate in PT. May consider referral in the future    Psychologically:  - No needs at this time    Medications  - Continue PO Diclofenac 75 mg BID  - Continue Gabapentin 300 mg TID    Duration  - 1  week    Interventions:  - I suspect his pain is secondary to lumbar radiculopathy. Given how severe and debilitating his pain is I recommend a left paramedian interlaminar lumbar RADHA at L5/S1 utilizing live fluoroscopy and local anesthesia. Risks, benefits, alternatives discussed. He would like to proceed        Sincerely,  Bucky Allen MD  [facility] Pain [facility] - [place]

## 2024-09-04 NOTE — PROGRESS NOTES
Physical Therapy  Physical Therapy Orthopedic Evaluation    Patient Name: Magdiel Richardson  MRN: [id number]  Today's Date: 9/5/2024    Time Calculation  Start Time: 1430  Stop Time: 1500  Time Calculation (min): 30 min    Insurance:  Insurance Type: A[employer]na Medicare  Visit number: 1  Visit Limit: MN  Authorization info: 9/5/24 to 12/2/24    General:  Reason for visit: lumbar radiculopathy, spinal stenosis  Referred by: Saravanan      Precautions: high fall ris     STEADI Fall Risk Score (The score of 4 or more indicates an increased risk of falling): 8    Current Problem  1. Back pain  Follow Up In Physical Therapy      2. Pain of left lower extremity  PT eval and treat            Subjective:   Subjective   Chief Complaint: low back pain  Onset: 8/1/24  BETH: acute on chronic    Pt reports to session with chief complaints of acute on chronic low back pain. Pt has long history of low back pain but six weeks ago had random onset of nerve pain down BLEs. Pt has been diagnosed with lumbar spinal stenosis and osteoarthritis. Pt reports standing, sitting, walking, stairs are difficult due to pain. Denies any recent falls but has history of falls in prior years. Has been using a walker for ambulation but hoping to improve to least restrictive ambulation. Pt is interested in aquatic therapy for this complaint.     Current Condition:  worse    Living will: Yes  Healthcare POA: Yes    PAIN  Intensity (0-10): 3/10 current; 10/10 worst  Location: low back to LLE  Description: sharp    Aggravating Factors:  bending, lifting, twisting  Relieving Factors:  rest, massage    Relevant Information (PMH & Previous Tests/Imaging): xray, CT  Previous Interventions/Treatments: n/a    Prior Level of Function (PLOF)  Exercise/Physical Activity: independent  Work/School: independent    Treatment Goals: reduce pain, improve walking     Red Flags: Do you have any of the following? No  Fever/chills, unexplained weight changes, dizziness/fainting,  unexplained change in bowel or bladder functions, unexplained malaise or muscle weakness, night pain/sweats, numbness or tingling    Objective:  Objective   Observation: mild forward trunk flexion    Gait: quick, steppage gait pattern with walker    4 Stage Balance Test: deferred     Lumbar AROM WFL    LE MMT   Hip Flexion  R: 4-  L: 4-  Hip Extension  R: 4-  L: 4-  Hip Abduction  R: 4-  L: 4-  Hip IR  R: 4-  L: 4-  Hip ER  R: 4-  L: 4-  Knee Flexion  R: 4  L: 4  Knee Extension  R: 4  L: 4  Ankle DF  R: 4  L: 4  Ankle PF  R: 4  L: 4    Flexibility  Hamstrings: max tight B  Quads: deferred  Hip Flexors: max tight B    Functional Screening  Transfers: mod independent to independent    Outcome Measures:  CATHIE: 58%    EDUCATION: home exercise program, plan of care, activity modifications, pain management, and injury pathology       Goals:  Active       PT Problem       PT Goal 1       Start:  09/05/24    Expected End:  09/26/24       In 3 weeks, pt will rate pain 0-3 on the numeric pain scale to allow for restful nights of sleep.  In 3 weeks, pt will be able to walk grocery store for 15 minutes without an increase in pain.  In 3 weeks, pt will be able to stand to perform hygiene routine safely for 15 minutes without an increase in pain.         PT Goal 2       Start:  09/05/24    Expected End:  10/17/24       In 6 weeks, pt will be able to negotiate stairs in home without an increase in symptoms.  In 6 weeks, pt's CATHIE will be +6 .  In 6 weeks, pt will be independent with HEP.               Treatments:   See below      HEP Provided:    Access Code: [email or web address]  URL: [email or web address]  Date: 09/05/2024  Prepared by: Ritchie Bacon    Exercises  - Supine Transversus Abdominis Bracing - Hands on Stomach  - 1 x daily - 7 x weekly - 50 reps  - Supine Active Straight Leg Raise  - 1 x daily - 7 x weekly - 50 reps  - Supine Hip Adduction Isometric with Ball  - 1 x daily - 7 x weekly - 50 reps  - Supine  Lower Trunk Rotation  - 1 x daily - 7 x weekly - 50 reps    Charges: low complexity eval x 1, Therapeutic Exercise x 1      Assessment: Pt is a 80 y/o M with signs and symptoms consistent with the referring diagnosis of lumbar spinal stenosis. Pt displayed lack of lumbar range of motion, gross lower extremity strength as well as flexibility, pain and functional mobility deficits. Skilled physical therapy is necessary in order to improve aforementioned impairments to allow for increased participation in functional and recreational activities without limitations. Plan of care will consist of core, lower extremity stretching and strengthening in order to return to PLOF.    Pt is to start with aquatic therapy then will be reassessed on land.    Eval Complexity: Low  Clinical Presentation: Stable  Prognosis: Fair to poor      Plan: Continue to improve functional mobility, functional strength in order to increase participation in ADLs.    Patient and/or family understands and agrees with goals and plan documented.       Planned Interventions include: therapeutic exercise, self-care home management, manual therapy, therapeutic activities, neuromuscular coordination, dry needling, gait training, aquatic therapy  Frequency: 1x/week  Duration: 6 weeks    Ritchie Bacon PT

## 2024-09-05 ENCOUNTER — OFFICE VISIT (OUTPATIENT)
Dept: PAIN MEDICINE | Facility: CLINIC | Age: 79
End: 2024-09-05
Payer: MEDICARE

## 2024-09-05 ENCOUNTER — EVALUATION (OUTPATIENT)
Dept: PHYSICAL THERAPY | Facility: CLINIC | Age: 79
End: 2024-09-05
Payer: MEDICARE

## 2024-09-05 VITALS
HEIGHT: 71 IN | WEIGHT: 198 LBS | RESPIRATION RATE: 18 BRPM | BODY MASS INDEX: 27.72 KG/M2 | SYSTOLIC BLOOD PRESSURE: 141 MMHG | DIASTOLIC BLOOD PRESSURE: 90 MMHG | OXYGEN SATURATION: 97 % | HEART RATE: 57 BPM

## 2024-09-05 DIAGNOSIS — M54.9 BACK PAIN: Primary | ICD-10-CM

## 2024-09-05 DIAGNOSIS — M54.16 LUMBAR RADICULOPATHY: Primary | ICD-10-CM

## 2024-09-05 DIAGNOSIS — M79.605 PAIN OF LEFT LOWER EXTREMITY: ICD-10-CM

## 2024-09-05 PROBLEM — G30.9 ALZHEIMER'S DISEASE (MULTI): Status: ACTIVE | Noted: 2024-09-05

## 2024-09-05 PROBLEM — S76.119A STRAIN OF QUADRICEPS TENDON: Status: ACTIVE | Noted: 2024-09-05

## 2024-09-05 PROBLEM — G89.29 CHRONIC PAIN OF RIGHT UPPER EXTREMITY: Status: ACTIVE | Noted: 2024-09-05

## 2024-09-05 PROBLEM — F02.80 ALZHEIMER'S DISEASE (MULTI): Status: ACTIVE | Noted: 2024-09-05

## 2024-09-05 PROBLEM — M79.601 CHRONIC PAIN OF RIGHT UPPER EXTREMITY: Status: ACTIVE | Noted: 2024-09-05

## 2024-09-05 PROBLEM — F17.201 TOBACCO DEPENDENCE IN REMISSION: Status: ACTIVE | Noted: 2024-09-05

## 2024-09-05 PROBLEM — M25.569 ARTHRALGIA OF KNEE: Status: ACTIVE | Noted: 2024-09-05

## 2024-09-05 PROBLEM — L91.8 OTHER HYPERTROPHIC DISORDERS OF THE SKIN: Status: ACTIVE | Noted: 2017-02-13

## 2024-09-05 PROCEDURE — 99213 OFFICE O/P EST LOW 20 MIN: CPT | Performed by: STUDENT IN AN ORGANIZED HEALTH CARE EDUCATION/TRAINING PROGRAM

## 2024-09-05 PROCEDURE — 1036F TOBACCO NON-USER: CPT | Performed by: STUDENT IN AN ORGANIZED HEALTH CARE EDUCATION/TRAINING PROGRAM

## 2024-09-05 PROCEDURE — 97110 THERAPEUTIC EXERCISES: CPT | Mod: GP

## 2024-09-05 PROCEDURE — 1160F RVW MEDS BY RX/DR IN RCRD: CPT | Performed by: STUDENT IN AN ORGANIZED HEALTH CARE EDUCATION/TRAINING PROGRAM

## 2024-09-05 PROCEDURE — 1159F MED LIST DOCD IN RCRD: CPT | Performed by: STUDENT IN AN ORGANIZED HEALTH CARE EDUCATION/TRAINING PROGRAM

## 2024-09-05 PROCEDURE — 1125F AMNT PAIN NOTED PAIN PRSNT: CPT | Performed by: STUDENT IN AN ORGANIZED HEALTH CARE EDUCATION/TRAINING PROGRAM

## 2024-09-05 PROCEDURE — 97161 PT EVAL LOW COMPLEX 20 MIN: CPT | Mod: GP

## 2024-09-05 RX ORDER — OXYCODONE HYDROCHLORIDE 5 MG/1
TABLET ORAL
COMMUNITY

## 2024-09-05 ASSESSMENT — PAIN DESCRIPTION - DESCRIPTORS: DESCRIPTORS: BURNING

## 2024-09-05 ASSESSMENT — PAIN - FUNCTIONAL ASSESSMENT: PAIN_FUNCTIONAL_ASSESSMENT: 0-10

## 2024-09-05 ASSESSMENT — PAIN SCALES - GENERAL
PAINLEVEL: 7
PAINLEVEL_OUTOF10: 7

## 2024-09-05 ASSESSMENT — PATIENT HEALTH QUESTIONNAIRE - PHQ9
1. LITTLE INTEREST OR PLEASURE IN DOING THINGS: NOT AT ALL
2. FEELING DOWN, DEPRESSED OR HOPELESS: NOT AT ALL
SUM OF ALL RESPONSES TO PHQ9 QUESTIONS 1 AND 2: 0

## 2024-09-05 ASSESSMENT — ENCOUNTER SYMPTOMS
DEPRESSION: 0
LOSS OF SENSATION IN FEET: 1
OCCASIONAL FEELINGS OF UNSTEADINESS: 1

## 2024-09-06 ENCOUNTER — ANCILLARY PROCEDURE (OUTPATIENT)
Dept: RADIOLOGY | Facility: EXTERNAL LOCATION | Age: 79
End: 2024-09-06
Payer: MEDICARE

## 2024-09-06 DIAGNOSIS — M54.16 RADICULOPATHY, LUMBAR REGION: ICD-10-CM

## 2024-09-06 PROCEDURE — 62323 NJX INTERLAMINAR LMBR/SAC: CPT | Performed by: STUDENT IN AN ORGANIZED HEALTH CARE EDUCATION/TRAINING PROGRAM

## 2024-09-06 NOTE — PROGRESS NOTES
Procedure Note: 2024    PROCEDURE NAME: Interlaminar Lumbar Epidural Steroid Injection at L5/S1    Patient Information: Magdiel Richardson, MRN (from [facility]) [id number], : 1945  Chief Complaint: Low back and leg pain    Pain Diagnosis: The diagnosis of Radiculopathy, lumbar region  has been made given the patient's clinical evaluation at prior evaluation.      DESCRIPTION OF PROCEDURE:    Magdiel Richardson was brought to the procedure room. The assistant obtained vital signs, which were found to be stable. He was then informed of the procedure, its benefits, and its risks, and his questions were answered regarding the procedure. Written informed consent was obtained from the patient. Immediately prior to starting the procedure, a time-out safety check was conducted and the patient's identification, procedure name, and procedure site were confirmed with the patient.    Left Paramedian Interlaminar Lumbar Epidural Steroid Injection, L5/S1  After informed written consent was obtained, the patient was placed in prone position with a pillow under the abdomen to reduce lumbar lordosis. Monitoring of pulse oximetry, heart rate, and blood pressure was done pre-procedure, and post-procedure. During the procedure the patient was monitored with continuous pulse-ox. A time out was performed before the beginning of the procedure. The correct site and laterality were marked. The skin was prepped with chlorhexidine, allowed to dry for a minimum of 3 minutes, and draped in a sterile fashion.     The L5/S1 vertebral level was identified with use of fluoroscopy in AP view with slight caudal tilt, and the target in the interlaminar space was identified and marked. The skin and subcutaneous tissue were anesthetized using 3 ml of 1% lidocaine with a 25 G 1.5 inch needle. A 3.5-inch 20 G Tuohy needle was slowly advanced towards the posterior epidural space until it was felt to be engaged in the ligamentum flavum. Needle position was  confirmed with contralateral oblique views. The epidural space was accessed using loss of resistance technique to normal saline. After negative aspiration for blood or CSF, 0.5 ml of Omnipaque contrast was injected to confirm the needle placement. A mixture of 80 mg triamcinolone diluted in 1 mL lidocaine 1% and 1 mL sterile normal saline with a total volume of 4 mL was injected in increments. The stylet was reinserted and the needle was removed      Anesthesia: local anesthesia   Complications: none      Bucky Allen MD

## 2024-09-12 ENCOUNTER — TREATMENT (OUTPATIENT)
Dept: PHYSICAL THERAPY | Facility: CLINIC | Age: 79
End: 2024-09-12
Payer: MEDICARE

## 2024-09-12 DIAGNOSIS — M54.9 BACK PAIN: Primary | ICD-10-CM

## 2024-09-12 PROCEDURE — 97113 AQUATIC THERAPY/EXERCISES: CPT | Mod: GP

## 2024-09-12 NOTE — PROGRESS NOTES
Physical Therapy  Physical Therapy Treatment    Patient Name: Magdiel Richardson  MRN: [id number]  Today's Date: 9/12/2024    Time Calculation  Start Time: 0825  Stop Time: 0900  Time Calculation (min): 35 min    Insurance:  Insurance Type: A[employer]na Medicare  Visit number: 2  Visit Limit: MN  Authorization info: 9/5/24 to 12/2/24    General:  Reason for visit: lumbar radiculopathy, spinal stenosis  Referred by: Saravanan      Precautions: high fall risk       Current Problem  1. Back pain  Follow Up In Physical Therapy          Pain: 5/10    Subjective:   Subjective   Patient reports that he has been doing okay.    HEP Compliance: Fair    Objective:   Objective   Functional Movements  Stair negotiation: step-to pattern w/ BUE support        Treatments:       Aquatic Therapy:                                            Exercise Sets/Reps Comments   Marching  5' 3 foot depth; 92 degrees   Hip abd 5'    Hip ext 5'    marching 5'    Sit to stands  5'    Calf raises 5'          Charges: Aquatic Therapy x 2    Assessment: Introduced three foot depth exercises focused on improving gait pattern, balance and functional strength to improve home safety and independence. Pt was able to perform exercises without complaints of increased symptoms. Pt used UE support during exercises to assist with balance. Pt was appropriately challenged with exercise selections.            Plan: Continue to improve functional mobility, functional strength in order to increase participation in ADLs.         Ritchie Bacon, PT

## 2024-09-25 ENCOUNTER — TREATMENT (OUTPATIENT)
Dept: PHYSICAL THERAPY | Facility: CLINIC | Age: 79
End: 2024-09-25
Payer: MEDICARE

## 2024-09-25 DIAGNOSIS — M54.9 BACK PAIN: Primary | ICD-10-CM

## 2024-09-25 PROCEDURE — 97113 AQUATIC THERAPY/EXERCISES: CPT | Mod: GP

## 2024-09-25 NOTE — PROGRESS NOTES
Physical Therapy  Physical Therapy Treatment    Patient Name: Magdiel Richardson  MRN: [id number]  Today's Date: 9/25/2024    Time Calculation  Start Time: 0915  Stop Time: 0958  Time Calculation (min): 43 min    Insurance:  Insurance Type: A[employer]na Medicare  Visit number: 3  Visit Limit: MN  Authorization info: 9/5/24 to 12/2/24    General:  Reason for visit: lumbar radiculopathy, spinal stenosis  Referred by: Saravanan    Precautions: high fall risk    Current Problem  1. Back pain  Follow Up In Physical Therapy          Pain: 0/10    Subjective:   Subjective   Patient reports that he has been so-so since last session.    HEP Compliance: Fair    Objective:   Objective   Normal gait pattern        Treatments:     Aquatic Therapy:                                            Exercise Sets/Reps Comments   Forward, backward walking 5' 4 foot depth; 92 degrees   Lateral walking 5'    Mini squats 5'    Hip abd 5'    Hip ext 5'    marching 5'    HS curls 5'    Calf raises 5'      Charges: Aquatic Therapy x 3    Assessment: Continued with mix of four foot exercises focused on improving gait pattern. Introduced seven foot depth exercises in order to allow for performance of non weight bearing exercises. Pt was appropriately challenged with exercises. Pt needed UE support during exercises to assist with balance and floatation.             Plan: Progress to deeper water next session.       Ritchie Bacon, PT

## 2024-09-26 ENCOUNTER — OFFICE VISIT (OUTPATIENT)
Dept: PRIMARY CARE | Facility: CLINIC | Age: 79
End: 2024-09-26
Payer: MEDICARE

## 2024-09-26 VITALS
DIASTOLIC BLOOD PRESSURE: 82 MMHG | HEART RATE: 61 BPM | TEMPERATURE: 97.5 F | SYSTOLIC BLOOD PRESSURE: 116 MMHG | OXYGEN SATURATION: 97 % | WEIGHT: 200.6 LBS | BODY MASS INDEX: 27.98 KG/M2

## 2024-09-26 DIAGNOSIS — Z00.00 ROUTINE GENERAL MEDICAL EXAMINATION AT HEALTH CARE FACILITY: ICD-10-CM

## 2024-09-26 DIAGNOSIS — F51.01 PRIMARY INSOMNIA: Primary | ICD-10-CM

## 2024-09-26 PROCEDURE — 1160F RVW MEDS BY RX/DR IN RCRD: CPT | Performed by: INTERNAL MEDICINE

## 2024-09-26 PROCEDURE — 1126F AMNT PAIN NOTED NONE PRSNT: CPT | Performed by: INTERNAL MEDICINE

## 2024-09-26 PROCEDURE — 1170F FXNL STATUS ASSESSED: CPT | Performed by: INTERNAL MEDICINE

## 2024-09-26 PROCEDURE — G0439 PPPS, SUBSEQ VISIT: HCPCS | Performed by: INTERNAL MEDICINE

## 2024-09-26 PROCEDURE — 1123F ACP DISCUSS/DSCN MKR DOCD: CPT | Performed by: INTERNAL MEDICINE

## 2024-09-26 PROCEDURE — 1036F TOBACCO NON-USER: CPT | Performed by: INTERNAL MEDICINE

## 2024-09-26 PROCEDURE — 1159F MED LIST DOCD IN RCRD: CPT | Performed by: INTERNAL MEDICINE

## 2024-09-26 PROCEDURE — 1158F ADVNC CARE PLAN TLK DOCD: CPT | Performed by: INTERNAL MEDICINE

## 2024-09-26 RX ORDER — TRAZODONE HYDROCHLORIDE 100 MG/1
100 TABLET ORAL NIGHTLY PRN
Qty: 30 TABLET | Refills: 1 | Status: SHIPPED | OUTPATIENT
Start: 2024-09-26 | End: 2024-11-25

## 2024-09-26 ASSESSMENT — PATIENT HEALTH QUESTIONNAIRE - PHQ9
2. FEELING DOWN, DEPRESSED OR HOPELESS: NOT AT ALL
1. LITTLE INTEREST OR PLEASURE IN DOING THINGS: NOT AT ALL
SUM OF ALL RESPONSES TO PHQ9 QUESTIONS 1 AND 2: 0

## 2024-09-26 ASSESSMENT — PAIN SCALES - GENERAL: PAINLEVEL: 0-NO PAIN

## 2024-09-26 NOTE — PROGRESS NOTES
Subjective   Reason for Visit: Magdiel Richardson is an 79 y.o. male here for a Medicare Wellness visit.     Past Medical, Surgical, and Family History reviewed and updated in chart.    Reviewed all medications by prescribing practitioner or clinical pharmacist (such as prescriptions, OTCs, herbal therapies and supplements) and documented in the medical record.    HPI  Patient seen in office today for medicare wellness visit and also has problem of neuropathy in both feet. Hemoglobin A1c is normal. Patient stop taking gabapentin.    He also complaint of sleeping problem and is not able to maintain sleep more than couple of hours and is willing to try medicine for it.    Patient Care Team:  Flako Miguel MD as PCP - General (Internal Medicine)  Ana Melo MD as PCP - [employer] Advantage PCP  Molly Estrada LPN as Care Manager (Case Management)     Review of Systems   Constitutional:  Negative for activity change, appetite change, fatigue and fever.   HENT:  Negative for congestion, dental problem, ear pain, hearing loss, rhinorrhea, sinus pressure, sinus pain, sore throat, trouble swallowing and voice change.    Eyes:  Negative for photophobia, discharge, redness and visual disturbance.   Respiratory:  Negative for cough, choking, chest tightness, shortness of breath, wheezing and stridor.    Cardiovascular:  Negative for chest pain, palpitations and leg swelling.   Gastrointestinal:  Negative for abdominal distention, abdominal pain, blood in stool, constipation, diarrhea, nausea and vomiting.   Endocrine: Negative for polydipsia, polyphagia and polyuria.   Genitourinary:  Negative for difficulty urinating, dysuria, flank pain, frequency, hematuria and urgency.   Musculoskeletal:  Positive for myalgias. Negative for arthralgias, back pain, gait problem and neck stiffness.   Skin:  Negative for color change and rash.   Allergic/Immunologic: Negative for immunocompromised state.   Neurological:  Positive for  numbness. Negative for dizziness, seizures, syncope, facial asymmetry, speech difficulty, weakness and headaches.   Hematological:  Does not bruise/bleed easily.   Psychiatric/Behavioral:  Positive for sleep disturbance. Negative for behavioral problems, confusion, dysphoric mood, hallucinations and suicidal ideas. The patient is not nervous/anxious.      Objective   Vitals:  /82   Pulse 61   Temp 36.4 °C (97.5 °F)   Wt 91 kg (200 lb 9.6 oz)   SpO2 97%   BMI 27.98 kg/m²       Physical Exam  Constitutional:       General: He is not in acute distress.     Appearance: Normal appearance. He is not ill-appearing or toxic-appearing.   HENT:      Head: Normocephalic.      Nose: Nose normal.      Mouth/Throat:      Mouth: Mucous membranes are moist.   Eyes:      General: No scleral icterus.     Extraocular Movements: Extraocular movements intact.      Conjunctiva/sclera: Conjunctivae normal.      Pupils: Pupils are equal, round, and reactive to light.   Cardiovascular:      Rate and Rhythm: Normal rate and regular rhythm.      Pulses: Normal pulses.      Heart sounds: Normal heart sounds. No murmur heard.  Pulmonary:      Effort: Pulmonary effort is normal. No respiratory distress.      Breath sounds: Normal breath sounds. No wheezing, rhonchi or rales.   Abdominal:      General: Abdomen is flat. Bowel sounds are normal.      Palpations: Abdomen is soft.      Tenderness: There is no abdominal tenderness.   Musculoskeletal:         General: No swelling or deformity. Normal range of motion.      Cervical back: Normal range of motion.      Right lower leg: No edema.      Left lower leg: No edema.   Skin:     General: Skin is warm.      Coloration: Skin is not jaundiced.      Findings: No erythema or lesion.   Neurological:      General: No focal deficit present.      Mental Status: He is alert and oriented to person, place, and time.      Cranial Nerves: No cranial nerve deficit.      Motor: No weakness.       Coordination: Coordination normal.      Gait: Gait normal.   Psychiatric:         Mood and Affect: Mood normal.         Behavior: Behavior normal.         Thought Content: Thought content normal.         Judgment: Judgment normal.       Assessment & Plan  Primary insomnia    Orders:    traZODone (Desyrel) 100 mg tablet; Take 1 tablet (100 mg) by mouth as needed at bedtime for sleep.    Routine general medical examination at health care facility    Orders:    1 Year Follow Up In Primary Care - Wellness Exam; Future

## 2024-10-01 ASSESSMENT — ENCOUNTER SYMPTOMS
ARTHRALGIAS: 0
NECK STIFFNESS: 0
EYE DISCHARGE: 0
RHINORRHEA: 0
DYSPHORIC MOOD: 0
SINUS PRESSURE: 0
DIFFICULTY URINATING: 0
SINUS PAIN: 0
MYALGIAS: 1
FATIGUE: 0
SHORTNESS OF BREATH: 0
NUMBNESS: 1
SPEECH DIFFICULTY: 0
FLANK PAIN: 0
HALLUCINATIONS: 0
ABDOMINAL PAIN: 0
POLYDIPSIA: 0
WHEEZING: 0
POLYPHAGIA: 0
SEIZURES: 0
VOMITING: 0
NAUSEA: 0
HEADACHES: 0
CONSTIPATION: 0
VOICE CHANGE: 0
FREQUENCY: 0
ABDOMINAL DISTENTION: 0
ACTIVITY CHANGE: 0
DIZZINESS: 0
EYE REDNESS: 0
DYSURIA: 0
SORE THROAT: 0
BLOOD IN STOOL: 0
NERVOUS/ANXIOUS: 0
STRIDOR: 0
WEAKNESS: 0
PHOTOPHOBIA: 0
COUGH: 0
PALPITATIONS: 0
COLOR CHANGE: 0
BRUISES/BLEEDS EASILY: 0
FEVER: 0
DIARRHEA: 0
APPETITE CHANGE: 0
SLEEP DISTURBANCE: 1
TROUBLE SWALLOWING: 0
CHEST TIGHTNESS: 0
CONFUSION: 0
BACK PAIN: 0
HEMATURIA: 0
FACIAL ASYMMETRY: 0
CHOKING: 0

## 2024-10-01 ASSESSMENT — ACTIVITIES OF DAILY LIVING (ADL)
PATIENT'S MEMORY ADEQUATE TO SAFELY COMPLETE DAILY ACTIVITIES?: YES
FEEDING YOURSELF: INDEPENDENT
EATING: INDEPENDENT
MANAGING FINANCES: INDEPENDENT
USING TRANSPORTATION: INDEPENDENT
HEARING - RIGHT EAR: FUNCTIONAL
GROOMING: INDEPENDENT
ADEQUATE_TO_COMPLETE_ADL: YES
STIL DRIVING: YES
DOING HOUSEWORK: INDEPENDENT
HEARING - LEFT EAR: FUNCTIONAL
GROCERY SHOPPING: INDEPENDENT
DRESSING YOURSELF: INDEPENDENT
BATHING: INDEPENDENT
USING TELEPHONE: INDEPENDENT
NEEDS ASSISTANCE WITH FOOD: INDEPENDENT
WALKS IN HOME: INDEPENDENT
TOILETING: INDEPENDENT
JUDGMENT_ADEQUATE_SAFELY_COMPLETE_DAILY_ACTIVITIES: YES
PREPARING MEALS: INDEPENDENT
TAKING MEDICATION: INDEPENDENT

## 2024-10-01 NOTE — PROGRESS NOTES
Physical Therapy  Physical Therapy Treatment    Patient Name: Magdiel Richardson  MRN: [id number]  Today's Date: 10/2/2024    Time Calculation  Start Time: 1050  Stop Time: 1130  Time Calculation (min): 40 min    Insurance:  Insurance Type: [employer]na Medicare  Visit number: 4  Visit Limit: MN  Authorization info: 9/5/24 to 12/2/24    General:  Reason for visit: lumbar radiculopathy, spinal stenosis  Referred by: Saravanan    Precautions: high fall risk      Current Problem  1. Back pain  Follow Up In Physical Therapy          Pain: 0/10    Subjective:   Subjective   Patient reports that he has been doing well. Notes that he has been doing aquatic exercises daily at the [facility].    HEP Compliance: Fair    Objective:   Objective   Mild shuffling gait pattern on land      Treatments:     Aquatic Therapy:                                            Exercise Sets/Reps Comments   Forward, backward walking 5' 4 foot depth; 92 degrees   Lateral walking 10'    Mini squats 5'    Hip abd 5'    Hip ext 5'    marching 5'    HS curls 5'      Charges: Aquatic Therapy x 3    Assessment: Pt is independent with aquatic exercises. Pt verbalizes agreement and understanding of discharge plan.       Plan: Discharge home with HEP.       Ritchie Bacon, PT

## 2024-10-02 ENCOUNTER — TREATMENT (OUTPATIENT)
Dept: PHYSICAL THERAPY | Facility: CLINIC | Age: 79
End: 2024-10-02
Payer: MEDICARE

## 2024-10-02 DIAGNOSIS — M54.9 BACK PAIN: Primary | ICD-10-CM

## 2024-10-02 PROCEDURE — 97113 AQUATIC THERAPY/EXERCISES: CPT | Mod: GP

## 2024-10-03 ENCOUNTER — APPOINTMENT (OUTPATIENT)
Dept: PAIN MEDICINE | Facility: CLINIC | Age: 79
End: 2024-10-03
Payer: MEDICARE

## 2024-10-09 NOTE — PROGRESS NOTES
"[facility]  Follow Up Office Visit Note 10/10/2024    Patient Information: Magdiel Richardson, MRN: [id number], : 1945   Primary Care/Referring Physician: Flako Miguel MD, [address] [address] / [facility] [address]     Chief Complaint: Left low back and leg    Interval History: Mr. Richardson presents today for follow up after L5/S1 RADHA.     Today he reports ***    At his last visit I started Gabapentin, Diclofenac, and ordered a lumbar RADHA at L5/S1    Today he reports no changes since last seen. He continues to have fairly severe left leg pain. He did not schedule the RADHA and there appears to have been some difficulty contacting him. They are frustrated by his continued, debilitating pain. He met with a surgeon with no plans for surgery    Brief History of Pain: Mr. Magdiel Richardson is a 79 y.o. male with a PMHx of HLD, hx of prostate cancer who presents for left low back and leg pain.    He reports onset of pain around 1 week ago with no obvious inciting event. He states that his back started \"tightening up\" at night, which gradually worsened until he decided to go to the ED and have it evaluated. This has progressed to an intermittent stabbing pain which starts in the left low back with radiation to his left lateral thigh and the right medial calf. He feels some tingling down in his left calf and also in his bilateral feet. He denies any weakness. He has difficulty identifying trigger factors. He had a lumbar spine CT in the ED showing both central and foraminal stenosis at multiple levels, worse at L4/5    Current Pain Medications: Gabapentin 300 mg TID, Diclofenac 75 mg BID  Previously Tried Pain Medications: Medrol dosepak - minimal relief, Methocarbamol - minimal relief, IM Toradol - no benefit    Relevant Surgeries: Hx of bilateral TKA. Denies lumbar spine or hip surgeries  Injections: Denies lumbar spine injections   Physical/Occupational Therapy: No recent PT    Medications:   Current " Outpatient Medications   Medication Instructions    cyclobenzaprine (FLEXERIL) 10 mg, oral, 3 times daily PRN    dexAMETHasone (Decadron) 2 mg tablet Take 5 tablets (10 mg) by mouth once daily for 1 day, THEN 4 tablets (8 mg) once daily for 2 days, THEN 3 tablets (6 mg) once daily for 2 days, THEN 2 tablets (4 mg) once daily for 2 days, THEN 1 tablet (2 mg) once daily for 2 days.    gabapentin (NEURONTIN) 600 mg, oral, Nightly    gabapentin (NEURONTIN) 300 mg, oral, 2 times daily    lidocaine (Lidoderm) 5 % patch 1 patch, transdermal, Daily, Remove & discard patch within 12 hours or as directed by MD.    memantine (NAMENDA) 10 mg, oral, 2 times daily    polyethylene glycol (GLYCOLAX, MIRALAX) 17 g, oral, Daily    traZODone (DESYREL) 100 mg, oral, Nightly PRN      Allergies: No Known Allergies    Past Medical & Surgical History:  Past Medical History:   Diagnosis Date    Prostate CA (Multi)       Past Surgical History:   Procedure Laterality Date    FRACTURE SURGERY      OTHER SURGICAL HISTORY  08/19/2016    History Of Prior Surgery    PROSTATE SURGERY  08/19/2016    Prostate Surgery       Family History   Problem Relation Name Age of Onset    Other (htn) Mother      Diabetes Mother      Diabetes Father      Other (htn) Father       Social History     Socioeconomic History    Marital status:      Spouse name: Not on file    Number of children: Not on file    Years of education: Not on file    Highest education level: Not on file   Occupational History    Not on file   Tobacco Use    Smoking status: Never    Smokeless tobacco: Never   Vaping Use    Vaping status: Never Used   Substance and Sexual Activity    Alcohol use: Never    Drug use: Never    Sexual activity: Not on file   Other Topics Concern    Not on file   Social History Narrative    Not on file     Social Determinants of Health     Financial Resource Strain: Low Risk  (8/6/2024)    Overall Financial Resource Strain (CARDIA)     Difficulty of Paying  Living Expenses: Not hard at all   Food Insecurity: Not on file   Transportation Needs: No Transportation Needs (8/6/2024)    PRAPARE - Transportation     Lack of Transportation (Medical): No     Lack of Transportation (Non-Medical): No   Physical Activity: Not on file   Stress: Not on file   Social Connections: Not on file   Intimate Partner Violence: Not on file   Housing Stability: High Risk (8/6/2024)    Housing Stability Vital Sign     Unable to Pay for Housing in the Last Year: Yes     Number of Times Moved in the Last Year: 1     Homeless in the Last Year: Yes       Problems, Past medical history, past surgical history, Medications, allergies, social and family history reviewed and as per the electronic medical record from today's encounter    Review of Systems:  CONST: No fever, chills, fatigue, weight changes  EYES: No loss of vision  ENT: No hearing loss, tinnitus  CV: No chest pain, palpitations  RESP: No dyspnea, shortness of breath, cough  GI: No stool incontinence, nausea, vomiting  : No urinary incontinence  MSK: No joint swelling  SKIN: No rash, no hives  NEURO: No headache, dizziness  PSYCH: No anxiety, depression  HEM/LYMPH: No easy bruising or bleeding  All other systems reviewed are negative     Physical Exam:  Vitals: There were no vitals taken for this visit.  General: No apparent distress. Alert, appropriate, oriented x 3. Mood and affect normal. Speaking in full sentences.  HENT: Normocephalic, atraumatic. Hearing intact.  Eyes: Extraocular movements grossly intact. Pupils equal and round.   Neck: Supple, trachea midline.  Lungs: Symmetric respiratory excursion on visual exam, nonlabored breathing.  Extremities: No clubbing, cyanosis, or edema noted in arms or legs.  Skin: No rashes, lesions, alopecia noted on back or extremities.   Neuro: Alert and appropriate. Using a wheelchair. Bulk and tone within normal limits.    Laboratory Data:  The following laboratory data were reviewed during  this visit:   Lab Results   Component Value Date    WBC 4.9 08/06/2024    RBC 4.46 (L) 08/06/2024    HGB 14.6 08/06/2024    HCT 42.0 08/06/2024     08/06/2024      Lab Results   Component Value Date    INR 1.1 08/05/2024     Lab Results   Component Value Date    CREATININE 0.91 08/06/2024    HGBA1C 5.4 07/31/2024       Imaging:  The following imaging impressions were reviewed by me during this visit:    -7/25/24 lumbar spine CT shows no evidence of compression fracture. Multilevel degenerative changes of the lumbar spine with severe disc height loss at L2-3, L3-L4, L4-L5, and L5-S1.  Multilevel lumbar central canal stenosis which is severe at L4-L5.  Multilevel mild to moderate osseous neural foraminal narrowing.  Severe left osseous neural foraminal narrowing at L3-L4 and severe bilateral osseous neural foraminal narrowing at L4-L5.  Multilevel facet degenerative changes most significant at L4-L5      I also personally reviewed the images from the above studies myself. These images and my interpretation of them contributed to the management and decision making of the patient's medical plan.    ASSESSMENT:  Mr. Magdiel Richardson is a 79 y.o. male with left low back and leg pain that is consistent with:    No diagnosis found.        PLAN:    Diagnostics:   - I reviewed the report his recent lumbar spine MRI (see above for details) which shows severe canal stenosis at L4/5 in the setting of anterolisthesis of L4 on L5, with moderate to severe foraminal stenosis at L3/4 through L5/S1, worse on the left.    Physical Therapy and Rehabilitation:     - I believe his pain is currently too severe to meaningfully participate in PT. May consider referral in the future    Psychologically:  - No needs at this time    Medications  - Continue PO Diclofenac 75 mg BID  - Continue Gabapentin 300 mg TID    Duration  - 1 week    Interventions:  - I suspect his pain is secondary to lumbar radiculopathy. Given how severe and  debilitating his pain is I recommend a left paramedian interlaminar lumbar RADHA at L5/S1 utilizing live fluoroscopy and local anesthesia. Risks, benefits, alternatives discussed. He would like to proceed        Sincerely,  Bucky Allen MD  [facility] [place]

## 2024-10-10 ENCOUNTER — APPOINTMENT (OUTPATIENT)
Dept: PAIN MEDICINE | Facility: CLINIC | Age: 79
End: 2024-10-10
Payer: MEDICARE

## 2024-10-14 NOTE — PROGRESS NOTES
"[facility]  Follow Up Office Visit Note 10/16/2024    Patient Information: Magdiel Richardson, MRN: [id number], : 1945   Primary Care/Referring Physician: Flako Miguel MD, [address] [address] / [facility] [address]     Chief Complaint: Left low back and leg    Interval History: Mr. Richardson presents today for follow up after L5/S1 RADHA.     Today he reports 100% pain relief from this injection and is overall pleased with the results. He has completed PT and is trying to maintain a HEP and doing exercises in the pool at the [facility] but still feels that he needs to use a walker because his left leg will occasionally \"give out\".     Brief History of Pain: Mr. Magdiel Richardson is a 79 y.o. male with a PMHx of HLD, hx of prostate cancer who presents for left low back and leg pain.    He reports onset of pain around 1 week ago with no obvious inciting event. He states that his back started \"tightening up\" at night, which gradually worsened until he decided to go to the ED and have it evaluated. This has progressed to an intermittent stabbing pain which starts in the left low back with radiation to his left lateral thigh and the right medial calf. He feels some tingling down in his left calf and also in his bilateral feet. He denies any weakness. He has difficulty identifying trigger factors. He had a lumbar spine CT in the ED showing both central and foraminal stenosis at multiple levels, worse at L4/5    Current Pain Medications: None  Previously Tried Pain Medications: Medrol dosepak - minimal relief, Methocarbamol - minimal relief, IM Toradol - no benefit, Diclofenac, Gabapentin    Relevant Surgeries: Hx of bilateral TKA. Denies lumbar spine or hip surgeries  Injections: L5/S1 RADHA - 100% pain relief   Physical/Occupational Therapy: No recent PT    Medications:   Current Outpatient Medications   Medication Instructions    cyclobenzaprine (FLEXERIL) 10 mg, oral, 3 times daily PRN    dexAMETHasone " (Decadron) 2 mg tablet Take 5 tablets (10 mg) by mouth once daily for 1 day, THEN 4 tablets (8 mg) once daily for 2 days, THEN 3 tablets (6 mg) once daily for 2 days, THEN 2 tablets (4 mg) once daily for 2 days, THEN 1 tablet (2 mg) once daily for 2 days.    gabapentin (NEURONTIN) 600 mg, oral, Nightly    gabapentin (NEURONTIN) 300 mg, oral, 2 times daily    lidocaine (Lidoderm) 5 % patch 1 patch, transdermal, Daily, Remove & discard patch within 12 hours or as directed by MD.    memantine (NAMENDA) 10 mg, oral, 2 times daily    polyethylene glycol (GLYCOLAX, MIRALAX) 17 g, oral, Daily    traZODone (DESYREL) 100 mg, oral, Nightly PRN      Allergies: No Known Allergies    Past Medical & Surgical History:  Past Medical History:   Diagnosis Date    Prostate CA (Multi)       Past Surgical History:   Procedure Laterality Date    FRACTURE SURGERY      OTHER SURGICAL HISTORY  08/19/2016    History Of Prior Surgery    PROSTATE SURGERY  08/19/2016    Prostate Surgery       Family History   Problem Relation Name Age of Onset    Other (htn) Mother      Diabetes Mother      Diabetes Father      Other (htn) Father       Social History     Socioeconomic History    Marital status:      Spouse name: Not on file    Number of children: Not on file    Years of education: Not on file    Highest education level: Not on file   Occupational History    Not on file   Tobacco Use    Smoking status: Never    Smokeless tobacco: Never   Vaping Use    Vaping status: Never Used   Substance and Sexual Activity    Alcohol use: Never    Drug use: Never    Sexual activity: Not on file   Other Topics Concern    Not on file   Social History Narrative    Not on file     Social Drivers of Health     Financial Resource Strain: Low Risk  (8/6/2024)    Overall Financial Resource Strain (CARDIA)     Difficulty of Paying Living Expenses: Not hard at all   Food Insecurity: Not on file   Transportation Needs: No Transportation Needs (8/6/2024)    PRAPARAURA  "- Transportation     Lack of Transportation (Medical): No     Lack of Transportation (Non-Medical): No   Physical Activity: Not on file   Stress: Not on file   Social Connections: Not on file   Intimate Partner Violence: Not on file   Housing Stability: High Risk (8/6/2024)    Housing Stability Vital Sign     Unable to Pay for Housing in the Last Year: Yes     Number of Times Moved in the Last Year: 1     Homeless in the Last Year: Yes       Problems, Past medical history, past surgical history, Medications, allergies, social and family history reviewed and as per the electronic medical record from today's encounter    Review of Systems:  CONST: No fever, chills, fatigue, weight changes  EYES: No loss of vision  ENT: No hearing loss, tinnitus  CV: No chest pain, palpitations  RESP: No dyspnea, shortness of breath, cough  GI: No stool incontinence, nausea, vomiting  : No urinary incontinence  MSK: No joint swelling  SKIN: No rash, no hives  NEURO: No headache, dizziness  PSYCH: No anxiety, depression  HEM/LYMPH: No easy bruising or bleeding  All other systems reviewed are negative     Physical Exam:  Vitals: /70   Pulse 56   Resp 18   Ht 1.803 m (5' 11\")   Wt 90.7 kg (200 lb)   SpO2 97%   BMI 27.89 kg/m²   General: No apparent distress. Alert, appropriate, oriented x 3. Mood and affect normal. Speaking in full sentences.  HENT: Normocephalic, atraumatic. Hearing intact.  Eyes: Extraocular movements grossly intact. Pupils equal and round.   Neck: Supple, trachea midline.  Lungs: Symmetric respiratory excursion on visual exam, nonlabored breathing.  Extremities: No clubbing, cyanosis, or edema noted in arms or legs.  Skin: No rashes, lesions, alopecia noted on back or extremities.   Neuro: Alert and appropriate. Using a walker to ambulate. Bulk and tone within normal limits.    Laboratory Data:  The following laboratory data were reviewed during this visit:   Lab Results   Component Value Date    WBC 4.9 " 08/06/2024    RBC 4.46 (L) 08/06/2024    HGB 14.6 08/06/2024    HCT 42.0 08/06/2024     08/06/2024      Lab Results   Component Value Date    INR 1.1 08/05/2024     Lab Results   Component Value Date    CREATININE 0.91 08/06/2024    HGBA1C 5.4 07/31/2024       Imaging:  The following imaging impressions were reviewed by me during this visit:    -7/25/24 lumbar spine CT shows no evidence of compression fracture. Multilevel degenerative changes of the lumbar spine with severe disc height loss at L2-3, L3-L4, L4-L5, and L5-S1.  Multilevel lumbar central canal stenosis which is severe at L4-L5.  Multilevel mild to moderate osseous neural foraminal narrowing.  Severe left osseous neural foraminal narrowing at L3-L4 and severe bilateral osseous neural foraminal narrowing at L4-L5.  Multilevel facet degenerative changes most significant at L4-L5      I also personally reviewed the images from the above studies myself. These images and my interpretation of them contributed to the management and decision making of the patient's medical plan.    ASSESSMENT:  Mr. Magdiel Richardson is a 79 y.o. male with left low back and leg pain that is consistent with:    1. Lumbar radiculopathy    2. Weakness of both lower extremities        PLAN:    Diagnostics:   - I reviewed the report his recent lumbar spine MRI (see above for details) which shows severe canal stenosis at L4/5 in the setting of anterolisthesis of L4 on L5, with moderate to severe foraminal stenosis at L3/4 through L5/S1, worse on the left.    Physical Therapy and Rehabilitation:     - Referral for land based PT to focus on leg strengthening and gait so that he can stop using his walker    Psychologically:  - No needs at this time    Medications  - No changes to medication today    Duration  - 1 week    Interventions:  - I suspect his pain is secondary to lumbar radiculopathy. He underwent a left paramedian interlaminar lumbar RADHA at L5/S1 with 100% pain relief. Will  monitor for duration of relief and consider repeating in the future        Sincerely,  Bucky Allen MD  [facility] [place]

## 2024-10-16 ENCOUNTER — OFFICE VISIT (OUTPATIENT)
Dept: PAIN MEDICINE | Facility: CLINIC | Age: 79
End: 2024-10-16
Payer: MEDICARE

## 2024-10-16 VITALS
OXYGEN SATURATION: 97 % | HEART RATE: 56 BPM | DIASTOLIC BLOOD PRESSURE: 70 MMHG | HEIGHT: 71 IN | WEIGHT: 200 LBS | BODY MASS INDEX: 28 KG/M2 | RESPIRATION RATE: 18 BRPM | SYSTOLIC BLOOD PRESSURE: 124 MMHG

## 2024-10-16 DIAGNOSIS — R29.898 WEAKNESS OF BOTH LOWER EXTREMITIES: ICD-10-CM

## 2024-10-16 DIAGNOSIS — M54.16 LUMBAR RADICULOPATHY: Primary | ICD-10-CM

## 2024-10-16 PROCEDURE — 99214 OFFICE O/P EST MOD 30 MIN: CPT | Performed by: STUDENT IN AN ORGANIZED HEALTH CARE EDUCATION/TRAINING PROGRAM

## 2024-10-16 ASSESSMENT — PAIN SCALES - GENERAL
PAINLEVEL_OUTOF10: 0 - NO PAIN
PAINLEVEL_OUTOF10: 0-NO PAIN

## 2024-10-16 ASSESSMENT — PAIN DESCRIPTION - DESCRIPTORS: DESCRIPTORS: ACHING

## 2024-10-16 ASSESSMENT — PAIN - FUNCTIONAL ASSESSMENT: PAIN_FUNCTIONAL_ASSESSMENT: 0-10

## 2024-10-29 ENCOUNTER — APPOINTMENT (OUTPATIENT)
Dept: PRIMARY CARE | Facility: CLINIC | Age: 79
End: 2024-10-29
Payer: MEDICARE

## 2024-10-29 VITALS
DIASTOLIC BLOOD PRESSURE: 76 MMHG | BODY MASS INDEX: 27.44 KG/M2 | SYSTOLIC BLOOD PRESSURE: 126 MMHG | WEIGHT: 196 LBS | OXYGEN SATURATION: 96 % | HEIGHT: 71 IN | HEART RATE: 71 BPM | TEMPERATURE: 97.5 F

## 2024-10-29 DIAGNOSIS — F02.80 ALZHEIMER DISEASE (MULTI): ICD-10-CM

## 2024-10-29 DIAGNOSIS — G30.9 ALZHEIMER DISEASE (MULTI): ICD-10-CM

## 2024-10-29 DIAGNOSIS — F51.01 PRIMARY INSOMNIA: Primary | ICD-10-CM

## 2024-10-29 DIAGNOSIS — R06.02 SHORTNESS OF BREATH: ICD-10-CM

## 2024-10-29 PROCEDURE — 1036F TOBACCO NON-USER: CPT | Performed by: INTERNAL MEDICINE

## 2024-10-29 PROCEDURE — 99213 OFFICE O/P EST LOW 20 MIN: CPT | Performed by: INTERNAL MEDICINE

## 2024-10-29 PROCEDURE — 1159F MED LIST DOCD IN RCRD: CPT | Performed by: INTERNAL MEDICINE

## 2024-10-29 PROCEDURE — 1160F RVW MEDS BY RX/DR IN RCRD: CPT | Performed by: INTERNAL MEDICINE

## 2024-10-29 RX ORDER — MEMANTINE HYDROCHLORIDE 10 MG/1
10 TABLET ORAL 2 TIMES DAILY
Qty: 180 TABLET | Refills: 3 | Status: SHIPPED | OUTPATIENT
Start: 2024-10-29 | End: 2025-10-29

## 2024-10-29 RX ORDER — TRAZODONE HYDROCHLORIDE 100 MG/1
100 TABLET ORAL NIGHTLY PRN
Qty: 90 TABLET | Refills: 1 | Status: SHIPPED | OUTPATIENT
Start: 2024-10-29 | End: 2025-04-27

## 2024-10-29 RX ORDER — ALBUTEROL SULFATE 90 UG/1
2 INHALANT RESPIRATORY (INHALATION) EVERY 4 HOURS PRN
Qty: 8 G | Refills: 5 | Status: SHIPPED | OUTPATIENT
Start: 2024-10-29 | End: 2025-10-29

## 2024-10-31 ENCOUNTER — PATIENT OUTREACH (OUTPATIENT)
Dept: PRIMARY CARE | Facility: CLINIC | Age: 79
End: 2024-10-31
Payer: MEDICARE

## 2024-11-06 PROBLEM — F51.01 PRIMARY INSOMNIA: Status: ACTIVE | Noted: 2024-11-06

## 2024-11-06 ASSESSMENT — ENCOUNTER SYMPTOMS
SHORTNESS OF BREATH: 0
HEADACHES: 0
ABDOMINAL DISTENTION: 0
POLYPHAGIA: 0
DECREASED CONCENTRATION: 1
CONFUSION: 0
VOICE CHANGE: 0
STRIDOR: 0
FACIAL ASYMMETRY: 0
FLANK PAIN: 0
SLEEP DISTURBANCE: 1
ACTIVITY CHANGE: 0
COUGH: 0
APPETITE CHANGE: 0
POLYDIPSIA: 0
ABDOMINAL PAIN: 0
CONSTIPATION: 0
CHOKING: 0
EYE REDNESS: 0
HALLUCINATIONS: 0
RHINORRHEA: 0
WEAKNESS: 0
NECK STIFFNESS: 0
COLOR CHANGE: 0
FATIGUE: 1
NUMBNESS: 0
NAUSEA: 0
BRUISES/BLEEDS EASILY: 0
FEVER: 0
BACK PAIN: 0
SINUS PAIN: 0
SPEECH DIFFICULTY: 0
MYALGIAS: 0
VOMITING: 0
SEIZURES: 0
DYSPHORIC MOOD: 0
PALPITATIONS: 0
TROUBLE SWALLOWING: 0
DIFFICULTY URINATING: 0
DIARRHEA: 0
SINUS PRESSURE: 0
WHEEZING: 0
ARTHRALGIAS: 0
FREQUENCY: 0
PHOTOPHOBIA: 0
CHEST TIGHTNESS: 0
DIZZINESS: 0
NERVOUS/ANXIOUS: 0
BLOOD IN STOOL: 0
DYSURIA: 0
EYE DISCHARGE: 0

## 2024-11-06 NOTE — PROGRESS NOTES
"Subjective   Patient ID: Magdiel Richardson is a 79 y.o. male who presents for Follow-up (Sleeping concerns).    HPI   Patient presented to the office for medication refill. While on trazodone he feels better but ran out the medication. He also need refill on other prescription.     Review of Systems   Constitutional:  Positive for fatigue. Negative for activity change, appetite change and fever.   HENT:  Negative for congestion, dental problem, ear pain, hearing loss, rhinorrhea, sinus pressure, sinus pain, trouble swallowing and voice change.    Eyes:  Negative for photophobia, discharge, redness and visual disturbance.   Respiratory:  Negative for cough, choking, chest tightness, shortness of breath, wheezing and stridor.    Cardiovascular:  Negative for chest pain, palpitations and leg swelling.   Gastrointestinal:  Negative for abdominal distention, abdominal pain, blood in stool, constipation, diarrhea, nausea and vomiting.   Endocrine: Negative for polydipsia, polyphagia and polyuria.   Genitourinary:  Negative for difficulty urinating, dysuria, flank pain, frequency and urgency.   Musculoskeletal:  Negative for arthralgias, back pain, myalgias and neck stiffness.   Skin:  Negative for color change and rash.   Allergic/Immunologic: Negative for immunocompromised state.   Neurological:  Negative for dizziness, seizures, syncope, facial asymmetry, speech difficulty, weakness, numbness and headaches.   Hematological:  Does not bruise/bleed easily.   Psychiatric/Behavioral:  Positive for decreased concentration and sleep disturbance. Negative for behavioral problems, confusion, dysphoric mood, hallucinations and suicidal ideas. The patient is not nervous/anxious.      Objective   /76   Pulse 71   Temp 36.4 °C (97.5 °F)   Ht 1.803 m (5' 11\")   Wt 88.9 kg (196 lb)   SpO2 96%   BMI 27.34 kg/m²     Physical Exam  Vitals and nursing note reviewed.   HENT:      Head: Normocephalic.      Nose: Nose normal. No " congestion.   Eyes:      Conjunctiva/sclera: Conjunctivae normal.   Cardiovascular:      Rate and Rhythm: Normal rate and regular rhythm.      Pulses: Normal pulses.      Heart sounds: Normal heart sounds. No murmur heard.  Pulmonary:      Effort: Pulmonary effort is normal. No respiratory distress.      Breath sounds: Normal breath sounds. No stridor. No wheezing, rhonchi or rales.   Musculoskeletal:         General: Normal range of motion.      Cervical back: Normal range of motion.   Neurological:      General: No focal deficit present.      Mental Status: He is oriented to person, place, and time.   Psychiatric:         Mood and Affect: Mood normal.         Behavior: Behavior normal.       Assessment/Plan   Problem List Items Addressed This Visit       Alzheimer disease (Multi)    Relevant Medications    memantine (Namenda) 10 mg tablet    Primary insomnia - Primary    Relevant Medications    traZODone (Desyrel) 100 mg tablet     Other Visit Diagnoses       Shortness of breath        Relevant Medications    albuterol (Ventolin HFA) 90 mcg/actuation inhaler

## 2025-02-10 ENCOUNTER — APPOINTMENT (OUTPATIENT)
Dept: VASCULAR SURGERY | Facility: CLINIC | Age: 80
End: 2025-02-10
Payer: MEDICARE

## 2025-02-10 ENCOUNTER — APPOINTMENT (OUTPATIENT)
Dept: VASCULAR MEDICINE | Facility: CLINIC | Age: 80
End: 2025-02-10
Payer: MEDICARE

## 2025-02-26 ENCOUNTER — OFFICE VISIT (OUTPATIENT)
Dept: BEHAVIORAL HEALTH | Facility: CLINIC | Age: 80
End: 2025-02-26
Payer: MEDICARE

## 2025-02-26 VITALS
HEART RATE: 57 BPM | DIASTOLIC BLOOD PRESSURE: 83 MMHG | WEIGHT: 206.4 LBS | SYSTOLIC BLOOD PRESSURE: 151 MMHG | BODY MASS INDEX: 28.79 KG/M2 | TEMPERATURE: 97.3 F

## 2025-02-26 DIAGNOSIS — G31.84 MCI (MILD COGNITIVE IMPAIRMENT): ICD-10-CM

## 2025-02-26 PROCEDURE — 1036F TOBACCO NON-USER: CPT | Performed by: PSYCHIATRY & NEUROLOGY

## 2025-02-26 PROCEDURE — 1126F AMNT PAIN NOTED NONE PRSNT: CPT | Performed by: PSYCHIATRY & NEUROLOGY

## 2025-02-26 PROCEDURE — 99215 OFFICE O/P EST HI 40 MIN: CPT | Performed by: PSYCHIATRY & NEUROLOGY

## 2025-02-26 PROCEDURE — 1159F MED LIST DOCD IN RCRD: CPT | Performed by: PSYCHIATRY & NEUROLOGY

## 2025-02-26 PROCEDURE — 99215 OFFICE O/P EST HI 40 MIN: CPT | Mod: AM | Performed by: PSYCHIATRY & NEUROLOGY

## 2025-02-26 PROCEDURE — 1160F RVW MEDS BY RX/DR IN RCRD: CPT | Performed by: PSYCHIATRY & NEUROLOGY

## 2025-02-26 RX ORDER — MEMANTINE HYDROCHLORIDE 10 MG/1
10 TABLET ORAL 2 TIMES DAILY
Qty: 180 TABLET | Refills: 3 | Status: SHIPPED | OUTPATIENT
Start: 2025-02-26 | End: 2026-02-21

## 2025-02-26 RX ORDER — MEMANTINE HYDROCHLORIDE 5 MG-10 MG
KIT ORAL
Qty: 49 TABLET | Refills: 12 | Status: SHIPPED | OUTPATIENT
Start: 2025-02-26 | End: 2026-02-26

## 2025-02-26 ASSESSMENT — ENCOUNTER SYMPTOMS
LOSS OF SENSATION IN FEET: 1
DEPRESSION: 0
OCCASIONAL FEELINGS OF UNSTEADINESS: 0

## 2025-02-26 ASSESSMENT — MONTREAL COGNITIVE ASSESSMENT (MOCA)
6. READ LIST OF DIGITS [FORWARD/BACKWARD]: 2
WHAT IS THE TOTAL SCORE (OUT OF 30): 21
4. NAME EACH OF THE THREE ANIMALS SHOWN: 2
5. MEMORY TRIALS: 0
10. [FLUENCY] NAME WORDS STARTING WITH DESIGNATED LETTER: 1
7. [VIGILENCE] TAP WHEN HEARING DESIGNATED LETTER: 1
13. ORIENTATION SUBSCORE: 6
12. MEMORY INDEX SCORE: 0
11. FOR EACH PAIR OF WORDS, WHAT CATEGORY DO THEY BELONG TO (OUT OF 2): 2
8. SERIAL SUBTRACTION OF 7S: 3
WHAT LEVEL OF EDUCATION WAS ATTAINED: 0
VISUOSPATIAL/EXECUTIVE SUBSCORE: 2
9. REPEAT EACH SENTENCE: 2

## 2025-02-26 ASSESSMENT — PAIN SCALES - GENERAL: PAINLEVEL_OUTOF10: 0-NO PAIN

## 2025-02-26 NOTE — PATIENT INSTRUCTIONS
Plan:   - Restart memantine per schedule below:   Week 1: take Memantine 5mg once daily  Week 2: take memantine 5mg twice daily  Week 3: take memantine 5mg in the morning and 10mg in the evening  Week 4: take memantine 10mg twice daily.   - Contact me in case of any side effects.   - Please talk to your primary care doctor about sleep apnea evaluation and treatment.   - Continue to monitor for any changes in cognition and functioning.   - Follow up in about 9 months with repeat MoCA.   - Contact sooner if needed.     Brain-healthy lifestyle:   - Make sure your medical conditions (if any) such as diabetes, high blood pressure, high cholesterol, thyroid disease sleep apnea are optimally controlled.   - Use eyeglasses or hearing aids appropriately if needed.   - Eat a heart healthy diet (like a Mediterranean diet; lots of fruits and vegetables, fish; low fat;)  - Exercise regularly as tolerated.   - Maintain good sleep hygiene; avoid daytime naps; try to get 7 to 8 hours of continuous sleep at night.   - Stay mentally active - puzzles, word searches, books, playing cards.  - Stay socially active and engaged.

## 2025-02-26 NOTE — PROGRESS NOTES
"[place], [place]      [facility] Follow up:     Magdiel Richardson  is 79 y.o. -year-old with a history of dementia (Alzheimer's)  who is presenting today with chief complaint of cognitive changes.   He had previously seen Dr. Fields and is transferring care due to Dr. Fields's prison.     HPI:   He says he is feeling \"good.\"   He says his wife helps.     His wife notes that she helps him keep track of appointments. He cannot remember what he is supposed to get when he goes grocery shopping.     He stopped taking memantine in the summer - he was on a number of medications and may have misplaced them.     Mood - *okay.   Sleep - okay. Used trazodone when he had issues with sciatica.   Per Dr. Fields's note, he was evaluated for sleep apnea but was not referred for treatment.   Appetite - okay.   No death wishes or suicidal thoughts, intent or plans.     No AVH. No paranoia or delusions.     he does not have change in personality, social disinhibition, loss of empathy, change in dietary preferences, stereotyped or repetitive behaviors.     he does not have visual hallucinations, fluctuating cognition, tremors, REM sleep behavior disorder, falls.     He has urinary frequency but he does not have urinary incontinence.     Functional changes:   Current living situation - Lives at home; lives with spouse  Driving - no issues driving.    Finances - he manages some financial issues, and wife does the rest.   Cooking - cooks; no issues with leaving stoves on or burning pots/pans.   ADLS - independent with all ADLs.   Medications - Takes own medication; does not use pillbox.     Psychiatric Review Of Systems:   As above.     Medical Review Of Systems:    Pertinent items are noted in HPI.      Prior Work-up:   TSH - 1.15 (7/31/24)   Vit B12 - 333 (6/23/22)  Folate - n/a  Neuropsychological testing - n/a  Brain Imaging - MRI brain 6/23/22 - small vessel ischemic changes  Amyloid status: - " "n/a      PMH/PSH:  Past Medical History:   Diagnosis Date    Prostate CA (Multi)         Meds  Current Outpatient Medications on File Prior to Visit   Medication Sig Dispense Refill    albuterol (Ventolin HFA) 90 mcg/actuation inhaler Inhale 2 puffs every 4 hours if needed for wheezing or shortness of breath. (Patient not taking: Reported on 2025) 8 g 5    memantine (Namenda) 10 mg tablet Take 1 tablet (10 mg) by mouth 2 times a day. (Patient not taking: Reported on 2025) 180 tablet 3    polyethylene glycol (Glycolax, Miralax) 17 gram packet Take 17 g by mouth once daily. (Patient not taking: Reported on 2024) 20 packet 0    traZODone (Desyrel) 100 mg tablet Take 1 tablet (100 mg) by mouth as needed at bedtime for sleep. (Patient not taking: Reported on 2025) 90 tablet 1     No current facility-administered medications on file prior to visit.        Allergies:   No Known Allergies    Mental Status Examination:  Appearance: Appears to be stated age. Well groomed. Good hygiene.   Behavior/Attitude: Cooperative. Pleasant.    Motor: Psychomotor activity in average range. No abnormal involuntary movements.    Speech: Regular in rate, tone and volume. No pressure.   Mood: \"okay\"  Affect: Congruent to stated mood. Mobilized appropriately. Normal range.    Thought process: Goal-directed. Linear. Organized.   Thought content: No paranoia, delusion or ideas of reference elicited. No hallucinations in auditory, visual or other sensory modalities.    Suicidal ideation: denied.   Homicidal ideation: none reported  Insight: Partial  Judgment: Fair  Recent and remote memory:  Fair recall of major autobiographical information. Wife assisted with some details.    Attention/concentration: intact during visit   Language: No aphasia or paraphasic errors during conversation    Fund of knowledge: Average     MoCA- Denys Cognitive Assessment :   MoCA  Visuospatial/Executive: 2  Namin  Memory (Score '0' as this " is an Unscored Section): 0  Attention: Read List of Digits: 2  Attention: Read List of Letters: 1  Attention: Serial Sevens: 3  Language: Repeat: 2  Language: Fluency: 1 (11 words)  Abstraction: 2  Delayed Recall: 0 (MIS 4/15)  Orientation: 6  Add 1 Point if </=12 yr Education: 0 (Bachelors)  MOCA Total Score: 21      NEUROLOGICAL EXAMINATION  Cranial nerves:  Cranial nerve II: Visual fields full to confrontation.   Cranial nerves III, IV, and VI: Pupils round, equally reactive to light; no ptosis. EOMs intact. No nystagmus.   Cranial Nerve V: Facial sensation intact bilaterally.   Cranial nerve VII: Normal and symmetric facial strength.   Cranial nerve VIII: Hearing is intact bilaterally to tuning fork.   Cranial nerves IX and X: Palate elevates symmetrically.   Cranial nerve XI: Shoulder shrug and neck rotation strength are intact.   Cranial nerve XII: Tongue midline with normal strength.    Motor:   Normal muscle tone, bulk, and strength. No asymmetries noted.  Finger taps - normal  Hand opening/closing - normal  Tone - normal  Abnormal movements - No    Grasp reflex: negative  Deep tendon Reflexes: +2 both knees.     Coordination:  Finger-nose-finger testing is normal and without tremor or dysmetria.    Gait: no ataxia or bradykinesia    Romberg's sign: Slight swaying.       Assessment/Plan   Assessment:   Magdiel Richardson  is 79 y.o. -year-old with a history of dementia (Alzheimer's) who is presenting today with chief complaint of cognitive changes.     He has a history of insidious onset of cognitive difficulties. No significant functional impairment. Has possible sleep apnea but not treated.     MoCA:   Per note, June 2022 - 22/30 2/26/25 - 21/30 (2/5 visuospatial/executive; 0/5 delayed recall, MIS 4/15)    Diagnosis:   Mild cognitive impairment. Possible etiologies are Alzheimer's, vascular, sleep apnea.     Treatment Plan/Recommendations:   - Discussed diagnoses and possible treatment options.   - Will  re-start memantine per schedule below:   Week 1: take Memantine 5mg once daily  Week 2: take memantine 5mg twice daily  Week 3: take memantine 5mg in the morning and 10mg in the evening  Week 4: take memantine 10mg twice daily.   - Contact me in case of any side effects.   - Please talk to your primary care doctor about sleep apnea evaluation and treatment.   - Continue to monitor for any changes in cognition and functioning.   - Follow up in about 9 months with repeat MoCA.   - Contact sooner if needed.     Review with patient: Treatment plan reviewed with the patient.  Medication risks/benefit reviewed with the patient    Fatuma Vieyra MD

## 2025-03-11 ENCOUNTER — ANCILLARY PROCEDURE (OUTPATIENT)
Dept: VASCULAR MEDICINE | Facility: CLINIC | Age: 80
End: 2025-03-11
Payer: MEDICARE

## 2025-03-11 ENCOUNTER — OFFICE VISIT (OUTPATIENT)
Dept: VASCULAR SURGERY | Facility: CLINIC | Age: 80
End: 2025-03-11
Payer: MEDICARE

## 2025-03-11 VITALS
WEIGHT: 206 LBS | DIASTOLIC BLOOD PRESSURE: 70 MMHG | HEART RATE: 55 BPM | SYSTOLIC BLOOD PRESSURE: 118 MMHG | BODY MASS INDEX: 28.73 KG/M2

## 2025-03-11 DIAGNOSIS — I72.4 POPLITEAL ARTERY ANEURYSM, BILATERAL (CMS-HCC): ICD-10-CM

## 2025-03-11 DIAGNOSIS — I71.43 INFRARENAL ABDOMINAL AORTIC ANEURYSM (AAA) WITHOUT RUPTURE (CMS-HCC): ICD-10-CM

## 2025-03-11 DIAGNOSIS — I72.3 COMMON ILIAC ANEURYSM (CMS-HCC): ICD-10-CM

## 2025-03-11 DIAGNOSIS — I73.9 PAD (PERIPHERAL ARTERY DISEASE) (CMS-HCC): Primary | ICD-10-CM

## 2025-03-11 DIAGNOSIS — I72.3 ILIAC ANEURYSM (CMS-HCC): ICD-10-CM

## 2025-03-11 PROCEDURE — 93978 VASCULAR STUDY: CPT

## 2025-03-11 PROCEDURE — 1126F AMNT PAIN NOTED NONE PRSNT: CPT | Performed by: SURGERY

## 2025-03-11 PROCEDURE — 93978 VASCULAR STUDY: CPT | Performed by: SURGERY

## 2025-03-11 PROCEDURE — 99215 OFFICE O/P EST HI 40 MIN: CPT | Performed by: SURGERY

## 2025-03-11 PROCEDURE — 99215 OFFICE O/P EST HI 40 MIN: CPT | Mod: 25 | Performed by: SURGERY

## 2025-03-11 PROCEDURE — 1036F TOBACCO NON-USER: CPT | Performed by: SURGERY

## 2025-03-11 PROCEDURE — 1159F MED LIST DOCD IN RCRD: CPT | Performed by: SURGERY

## 2025-03-11 ASSESSMENT — VISUAL ACUITY: OU: 1

## 2025-03-11 ASSESSMENT — PAIN SCALES - GENERAL: PAINLEVEL_OUTOF10: 0-NO PAIN

## 2025-03-11 ASSESSMENT — LIFESTYLE VARIABLES
HOW MANY STANDARD DRINKS CONTAINING ALCOHOL DO YOU HAVE ON A TYPICAL DAY: PATIENT DOES NOT DRINK
HOW OFTEN DO YOU HAVE SIX OR MORE DRINKS ON ONE OCCASION: NEVER
HOW OFTEN DO YOU HAVE A DRINK CONTAINING ALCOHOL: NEVER
AUDIT-C TOTAL SCORE: 0
SKIP TO QUESTIONS 9-10: 1

## 2025-03-11 ASSESSMENT — ENCOUNTER SYMPTOMS
LOSS OF SENSATION IN FEET: 1
DEPRESSION: 0
OCCASIONAL FEELINGS OF UNSTEADINESS: 0

## 2025-03-11 NOTE — PROGRESS NOTES
Bariatric Consult:    Chief Complaint: Morbid Obesity  Referred by Marifer Cooper MD    Ariadna Ambrocio is here today for consult on Morbid Obesity    History of Present Illness:     Ariadna Ambrocio is a 39 y.o. female with morbid obesity with co-morbidities including GERD who presents for surgical consultation for the above procedure. Ariadna has completed the initial intake visit and has been examined by our nurse practitioner, dietician, psychologist and underwent the extensive educational teaching process under the guidance of our bariatric coordinator and myself. Ariadna also has seen the educational video BEN on the surgical procedure if available. Ariadna attended today more educational teaching from our bariatric coordinator and myself. Ariadna has had an extensive medical workup including a visit with their primary care physician, EKG, chest radiograph, blood work, EGD or UGI and possibly further testing. These have been reviewed by me and discussed with the patient. Ariadna is now ready to proceed with surgery. Ariadna presently denies nausea, vomiting, fever, chills, chest pain, shortness of air, melena, hematochezia, hemetemesis, dysuria, frequency, hematuria, jaundice or abdominal pain.     Wt Readings from Last 10 Encounters:   09/01/23 113 kg (249 lb 6.4 oz)   08/28/23 112 kg (246 lb)   07/10/23 109 kg (240 lb)   06/16/23 108 kg (239 lb)   06/02/23 108 kg (239 lb)   05/08/23 107 kg (236 lb)   04/19/23 108 kg (239 lb)   02/27/23 108 kg (238 lb 8.6 oz)   01/23/23 108 kg (238 lb)   01/19/23 110 kg (242 lb 6.4 oz)       Her pre-op EGD shows large hiatal hernia      Past Medical History:   Diagnosis Date    Anxiety     Bulging lumbar disc     Constipation     Depression     Diarrhea     Elevated triglycerides with high cholesterol     Heartburn     Herniated cervical disc     Hiatal hernia     Hypertension     IBS (irritable bowel syndrome)     Kidney stones     Nausea     Pre-diabetes     Rapid heart beat      Primary Care Physician: Flako Miguel MD  Primary Cardiologist:       Date of Visit: 03/11/2025  1:45 PM EDT  Location of visit: [facility]   Type of Visit: Follow up             Chief Complaint   Patient presents with    Follow-up       HPI / Summary:   Magdiel Richardson is a 79 y.o. male  with    who returns for routine follow up   Has no complaints.  He has known to be affected by a AAA and bilateral common iliac aneurysms.  Duplex today shows the abdominal aorta measures 3 cm in size and both iliac arteries at the common iliac level measures 2.5 cm.  This is unchanged and the size differential is from difference in techniques between CT scan and duplex.  The examination also revealed bounding popliteal pulses which we have not checked for aneurysm.  These may be popliteal aneurysms.  I will get an arterial duplex and follow-up with him by telephone.  Otherwise the aorta and iliac arteries can be rechecked in 1 years time.    12 system review is negative except as noted above        Medical History:   Past Medical History:   Diagnosis Date    Prostate CA (Multi)        Social History:   Tobacco Use: Medium Risk (3/11/2025)    Patient History     Smoking Tobacco Use: Former     Smokeless Tobacco Use: Never     Passive Exposure: Not on file         MEDICATIONS:   Current Outpatient Medications   Medication Instructions    albuterol (Ventolin HFA) 90 mcg/actuation inhaler 2 puffs, inhalation, Every 4 hours PRN    memantine (Namenda Titration Pack) 5-10 mg tablet pack Follow package directions.    memantine (NAMENDA) 10 mg, oral, 2 times daily         IMAGING REVIEWED:   Echocardiogram: No results found for this or any previous visit from the past 1825 days.    Stress Testing: No results found for this or any previous visit from the past 1825 days.    Cardiac Catheterization: No results found for this or any previous visit from the past 1825 days.    Cardiac Scoring: No results found for this or any previous  "visit from the past 1825 days.    AAA : No results found for this or any previous visit from the past 1825 days.    OTHER: No results found for this or any previous visit from the past 1825 days.          LABS:  CBC with Differential:    Lab Results   Component Value Date    WBC 4.9 08/06/2024    RBC 4.46 (L) 08/06/2024    HGB 14.6 08/06/2024    HCT 42.0 08/06/2024     08/06/2024    MCV 94 08/06/2024    MCH 32.7 08/06/2024    MCHC 34.8 08/06/2024    RDW 12.1 08/06/2024    NRBC 0.0 08/06/2024    LYMPHOPCT 36.7 08/05/2024    MONOPCT 7.3 08/05/2024    EOSPCT 1.3 08/05/2024    BASOPCT 0.7 08/05/2024    MONOSABS 0.39 08/05/2024    LYMPHSABS 1.97 08/05/2024    EOSABS 0.07 08/05/2024    BASOSABS 0.04 08/05/2024     CMP:    Lab Results   Component Value Date     (L) 08/06/2024    K 3.9 08/06/2024     08/06/2024    CO2 27 08/06/2024    BUN 14 08/06/2024    CREATININE 0.91 08/06/2024    GLUCOSE 101 (H) 08/06/2024    PROT 7.8 08/05/2024    CALCIUM 9.9 08/06/2024    BILITOT 0.8 08/05/2024    ALKPHOS 77 08/05/2024    AST 16 08/05/2024    ALT 25 08/05/2024     BMP:    Lab Results   Component Value Date     (L) 08/06/2024    K 3.9 08/06/2024     08/06/2024    CO2 27 08/06/2024    BUN 14 08/06/2024    CREATININE 0.91 08/06/2024    CALCIUM 9.9 08/06/2024    GLUCOSE 101 (H) 08/06/2024     Magnesium:No results found for: \"MG\"  Troponin:    Lab Results   Component Value Date    TROPHS 20 08/06/2024    TROPHS 15 08/05/2024     BNP: No results found for: \"BNP\"      Lipid Panel:  Lab Results   Component Value Date    HDL 53.6 07/31/2024    CHHDL 4.1 07/31/2024    VLDL 39 07/31/2024    TRIG 195 (H) 07/31/2024    NHDL 167 (H) 07/31/2024        Lab work and imaging results independently reviewed by me         Vascular Physical Exam  Constitutional:       Appearance: He is well-developed.   HENT:      Head: Normocephalic.      Mouth/Throat:      Mouth: Mucous membranes are moist.   Eyes:      General: Vision " Seasonal allergies        Encounter Diagnoses   Name Primary?    Obesity, Class III, BMI 40-49.9 (morbid obesity) Yes       Past Surgical History:   Procedure Laterality Date    COLONOSCOPY      CYSTOSCOPY, URETEROSCOPY, RETROGRADE PYELOGRAM, STENT INSERTION Left 07/16/2021    Procedure: CYSTOSCOPY URETEROSCOPY RETROGRADE PYELOGRAM HOLMIUM LASER BASKET STONE EXTRACTION STENT INSERTION;  Surgeon: Carmelo Nguyễn MD;  Location: [facility] MAIN OR;  Service: Urology;  Laterality: Left;    EAR TUBES      ENDOSCOPY N/A 2/27/2023    Procedure: ESOPHAGOGASTRODUODENOSCOPY with gastric antrum biopsy;  Surgeon: Otilia Capone MD;  Location: [facility] ENDOSCOPY;  Service: General;  Laterality: N/A;  Post: large hiatal hernia    KIDNEY STONE SURGERY      WISDOM TOOTH EXTRACTION           * No active hospital problems. *      No Known Allergies      Current Outpatient Medications:     albuterol sulfate  (90 Base) MCG/ACT inhaler, Inhale 2 puffs Every 4 (Four) Hours As Needed for Wheezing. (Patient taking differently: Inhale 2 puffs Every 4 (Four) Hours As Needed for Wheezing. May use if needed day of , bring), Disp: 8.5 g, Rfl: 0    cholecalciferol (VITAMIN D3) 25 MCG (1000 UT) tablet, Take 1 tablet by mouth Daily., Disp: , Rfl:     hydroCHLOROthiazide (MICROZIDE) 12.5 MG capsule, TAKE 2 CAPSULES BY MOUTH DAILY. DO NOT TAKE ON THE DAY OF THE PROCEDURE, Disp: 180 capsule, Rfl: 3    methocarbamol (ROBAXIN) 500 MG tablet, Take 1 tablet by mouth 3 (Three) Times a Day., Disp: , Rfl:     metoprolol tartrate (LOPRESSOR) 25 MG tablet, Take 0.5 tablets by mouth 2 (Two) Times a Day. (Patient taking differently: Take 0.5 tablets by mouth Daily. take day of procedure), Disp: 30 tablet, Rfl: 9    omeprazole (priLOSEC) 40 MG capsule, TAKE 1 CAPSULE BY MOUTH EVERY DAY (Patient taking differently: 2 (Two) Times a Day.), Disp: 90 capsule, Rfl: 2    sertraline (ZOLOFT) 50 MG tablet, Take 1 tablet by mouth Daily., Disp: , Rfl:     vitamin B-12  (CYANOCOBALAMIN) 1000 MCG tablet, Take 1 tablet by mouth Daily., Disp: , Rfl:     Social History     Socioeconomic History    Marital status:    Tobacco Use    Smoking status: Never     Passive exposure: Current    Smokeless tobacco: Never   Vaping Use    Vaping Use: Never used   Substance and Sexual Activity    Alcohol use: Yes     Comment: 2-3 drinks monthly    Drug use: Never    Sexual activity: Defer       Family History   Problem Relation Age of Onset    Asthma Mother     Hypertension Mother     No Known Problems Father     Hypertension Maternal Grandmother     Heart attack Maternal Grandfather     Cancer Paternal Grandmother     Cancer Paternal Grandfather     Breast cancer Neg Hx     Ovarian cancer Neg Hx     Uterine cancer Neg Hx     Colon cancer Neg Hx     Deep vein thrombosis Neg Hx     Pulmonary embolism Neg Hx        Review of Systems:  Review of Systems   Constitutional: Negative.    HENT: Negative.    Eyes: Negative.    Respiratory: Negative.    Cardiovascular: Negative.    Gastrointestinal: Negative.    Endocrine: Negative.    Genitourinary: Negative.    Musculoskeletal: Negative.    Skin: Negative.    Allergic/Immunologic: Negative.    Neurological: Negative.    Hematological: Negative.    Psychiatric/Behavioral: Negative.        Physical Exam:  Body mass index is 44.18 kg/m².   Vital Signs:  Weight: 113 kg (249 lb 6.4 oz)   Body mass index is 44.18 kg/m².      Heart Rate: 67   BP: 119/82     Awake and alert  Normal mental status  Normal pulmonary effort  Abdomen appropriate tenderness  Incisions no erythema  Extremities no tenderness or swelling        Assessment:  Patient Active Problem List   Diagnosis    Ureterolithiasis    Chest pain    Dyspnea on exertion    Paresthesia    Hypertension    Bradycardia, sinus    Paroxysmal SVT (supraventricular tachycardia)    Obesity, Class III, BMI 40-49.9 (morbid obesity)    Gastroesophageal reflux disease         Ariadna Ambrocio is a 39 y.o. year old  grossly intact.      Pupils: Pupils are equal, round, and reactive to light.   Neck:      Trachea: Phonation normal.   Cardiovascular:      Rate and Rhythm: Normal rate and regular rhythm.      Pulses:           Femoral pulses are 2+ on the right side and 2+ on the left side.       Popliteal pulses are 3+ on the right side and 3+ on the left side.        Dorsalis pedis pulses are 2+ on the right side and 2+ on the left side.          Posterior tibial pulses are 2+ on the right side and 2+ on the left side.    Pulmonary:      Effort: Pulmonary effort is normal.   Abdominal:      General: Abdomen is protuberant.      Palpations: Abdomen is soft.   Musculoskeletal:         General: Normal range of motion.      Neck: Full passive range of motion without pain and normal range of motion.   Skin:     General: Skin is warm.   Neurological:      General: No focal deficit present.      Mental Status: He is alert.   Psychiatric:         Mood and Affect: Mood normal.         Behavior: Behavior normal.           Diagnoses and all orders for this visit:  PAD (peripheral artery disease) (CMS-formerly [facility])  -     Vascular US lower extremity arterial duplex bilateral; Future  Popliteal artery aneurysm, bilateral (CMS-HCC)  -     Vascular US lower extremity arterial duplex bilateral; Future  Infrarenal abdominal aortic aneurysm (AAA) without rupture (CMS-formerly [facility])  -     Vascular US aorta iliac duplex complete; Future  Common iliac aneurysm (CMS-formerly [facility])  -     Vascular US aorta iliac duplex complete; Future            Ursula Ralph MD       Orders:  No orders of the defined types were placed in this encounter.        Followup Appts:  Future Appointments   Date Time Provider Department Center   [facility]19[facility]20[facility]  [facility] [id number] [facility]   11/19/2025  2:00 PM Fatuma Vieyra MD [id number] [facility]        female with medically complicated severe obesity with a BMI of Body mass index is 44.18 kg/m². and multiple co-morbidities listed in the encounter diagnosis.    I think she is an appropriate candidate for this surgery, and is ready to proceed.    The patient has returned to the office for a surgical consultation and has requested to proceed with the Nicho-en-Y  gastric bypass.  I have had the opportunity to obtain the history, examine the patient and review the patient's chart.    The patient understands that surgery is a tool and that weight loss is not guaranteed but only seen in the context of appropriate use, regular follow up, exercise and making appropriate food choices.      I have personally discussed the potential complications of the laparoscopic gastric bypass with this patient.  The patient is well aware of the potential complications of the surgery that include but not limited to bleeding, infections, deep venous thrombosis, pulmonary embolism, pulmonary complications such as pneumonia, cardiac events, hernias, small bowel obstruction, damage to the spleen or other organs, bowel injury, disfiguring scars, failure to lose weight, need for additional surgery, conversion to an open procedure and death.  I also discussed the risks that apply in particular to the gastric bypass such as the leaking of stomach and/or intestinal contents at the staple or suture line, the development of an intra-abdominal abscess,  strictures, ulcers, and vitamin/mineral deficiencies.  The patient was strongly advised to avoid smoking and the use of non-steroidal anti-inflammatory drugs such as ibuprofen, Motrin and Advil in the postoperative period and understands the increased risk of ulcer formation, perforation, death if they did not stop the use of these medications/substances.     The risks, benefits, potential complications and alternative therapies were discussed at great lengths as outlined in our extensive consent  forms, online consent, and educational teaching processes.      The patient has confirmed participation in the program's extensive educational activities.      All questions and concerns were answered to the patient's satisfaction.  The patient now wishes to proceed with surgery.    The patient [default value] pre-operative insertion of an IVC filter.    The patient [default value] a postoperative course of anticoagulant therapy.      Plan/Discussion/Summary:        I instructed patient to start on a H2 blocker or proton pump inhibitor if not already on one of these medications.    I explained in detail the procedures that we perform.  All of these procedures have a chance to convert to open if any technical challenges or complications do occur.  Bariatric surgery is not cosmetic surgery but rather a tool to help a patient make a life-long commitment lifestyle change including diet, exercise, behavior changes, and taking supplemental vitamins and minerals.    Problems after surgery may require more operations to correct them.    The risks, benefits, alternatives, and potential complications of all of the procedures were explained in detail including, but not limited to death, anesthesia and medication adverse effect, deep venous thrombosis, pulmonary embolism, trocar site/incisional hernia, wound infection, abdominal infection, bleeding, failure to lose weight, gain weight, a change in body image, metabolic complications with vitamin deficiences and anemia.    Weight loss expectations were discussed with the patient in detail. The weight loss operations most commonly performed are the sleeve gastrectomy and the Nicho-en-Y gastric bypass. These operations result in weight losses up to approximately 25-35% of initial body weight 12 to 24 months after surgery with the gastric bypass usually the higher percent of weight loss but depends on patient using the tool.    For the gastric bypass and loop duodenal switch  (GEOR[place]IA-S) the risks include but not limited to the following early complications:  Anastomotic leak/peritonitis, Nicho/Alimentary/biliopancreatic limb obstruction, severe & minor wound infection/seroma, and nausea/vomiting.  Late complications can include but are not limited to malnutrition, vitamin deficiencies, frequent loose stools,  stomal stenosis, marginal ulcer, bowel obstruction, intussusception, internal, and incisional hernia.    Regarding the gastric sleeve, there is less long-term outcome data and higher risk of dysphagia and reflux compared to a gastric bypass, as well as risk of internal visceral/organ injury, splenectomy, bleeding, infection, leak (which could require further intervention possible conversion to Nicho-en-Y gastric bypass), stenosis and possibility of regaining weight.    Ariadna was counseled regarding diagnostic results, instructions for management, risk factor reductions, prognosis, patient and family education, impressions, risks and benefits of treatment options and importance of compliance with treatment. Total face to face time of the encounter was over 45 minutes and over 30 minutes was spent counseling.     Ben Report   As part of this patient's treatment plan I am prescribing controlled substances. The patient has been made aware of appropriate use of such medications, including potential risk of somnolence, limited ability to drive and /or work safely, and potential for dependence or overdose. It has also been made clear that these medications are for use by this patient only, without concomitant use of alcohol or other substances unless prescribed.    Ariadna has completed prescribing agreement detailing terms of continued prescribing of controlled substances, including monitoring BEN reports, urine drug screening, and pill counts if necessary. Ariadna is aware that inappropriate use will result in cessation of prescribing such medications.    BEN report has been reviewed       History and physical exam exhibit continued safe and appropriate use of controlled substances.      Ariadna understands the surgical procedures and the different surgical options that are available.  She understands the lifestyle changes that are required after surgery and has agreed to follow the guidelines outlined in the weight management program.  She also expressed understanding of the risks involved and had all of female questions answered and desires to proceed.      Otilia Capone MD  9/5/2023

## 2025-03-25 ENCOUNTER — ANCILLARY PROCEDURE (OUTPATIENT)
Dept: VASCULAR MEDICINE | Facility: CLINIC | Age: 80
End: 2025-03-25
Payer: MEDICARE

## 2025-03-25 ENCOUNTER — APPOINTMENT (OUTPATIENT)
Dept: VASCULAR MEDICINE | Facility: CLINIC | Age: 80
End: 2025-03-25
Payer: MEDICARE

## 2025-03-25 DIAGNOSIS — I73.9 PAD (PERIPHERAL ARTERY DISEASE) (CMS-HCC): ICD-10-CM

## 2025-03-25 DIAGNOSIS — I72.4 POPLITEAL ARTERY ANEURYSM, BILATERAL (CMS-HCC): ICD-10-CM

## 2025-03-25 PROCEDURE — 93925 LOWER EXTREMITY STUDY: CPT | Mod: REDUCED SERVICES | Performed by: INTERNAL MEDICINE

## 2025-03-25 PROCEDURE — 93925 LOWER EXTREMITY STUDY: CPT | Mod: 52

## 2025-04-14 ENCOUNTER — TELEMEDICINE (OUTPATIENT)
Dept: VASCULAR SURGERY | Facility: CLINIC | Age: 80
End: 2025-04-14
Payer: MEDICARE

## 2025-04-14 VITALS — BODY MASS INDEX: 28.59 KG/M2 | WEIGHT: 205 LBS

## 2025-04-14 DIAGNOSIS — I72.3 COMMON ILIAC ANEURYSM (CMS-HCC): ICD-10-CM

## 2025-04-14 DIAGNOSIS — I71.43 INFRARENAL ABDOMINAL AORTIC ANEURYSM (AAA) WITHOUT RUPTURE: Primary | ICD-10-CM

## 2025-04-14 PROCEDURE — 1036F TOBACCO NON-USER: CPT | Performed by: SURGERY

## 2025-04-14 PROCEDURE — 99213 OFFICE O/P EST LOW 20 MIN: CPT | Performed by: SURGERY

## 2025-04-14 PROCEDURE — 1126F AMNT PAIN NOTED NONE PRSNT: CPT | Performed by: SURGERY

## 2025-04-14 PROCEDURE — 1159F MED LIST DOCD IN RCRD: CPT | Performed by: SURGERY

## 2025-04-14 ASSESSMENT — COLUMBIA-SUICIDE SEVERITY RATING SCALE - C-SSRS
2. HAVE YOU ACTUALLY HAD ANY THOUGHTS OF KILLING YOURSELF?: NO
6. HAVE YOU EVER DONE ANYTHING, STARTED TO DO ANYTHING, OR PREPARED TO DO ANYTHING TO END YOUR LIFE?: NO
1. IN THE PAST MONTH, HAVE YOU WISHED YOU WERE DEAD OR WISHED YOU COULD GO TO SLEEP AND NOT WAKE UP?: NO

## 2025-04-14 ASSESSMENT — ENCOUNTER SYMPTOMS
OCCASIONAL FEELINGS OF UNSTEADINESS: 0
LOSS OF SENSATION IN FEET: 0
DEPRESSION: 0

## 2025-04-14 ASSESSMENT — PAIN SCALES - GENERAL: PAINLEVEL_OUTOF10: 0-NO PAIN

## 2025-04-14 NOTE — PROGRESS NOTES
Patient and I had a telephone conversation regarding his popliteal duplex results.  He is a large man and I could fill bounding pulses behind his knees and I was concerned about his arteries they are having aneurysms.  Given the fact that he had aortoiliac aneurysms there was a 2% prevalence of concomitant popliteal aneurysms.  Duplex ultrasound shows normal popliteal arteries in both legs.  I explained this to them.  He is recommended to have a sleep apnea study which I agree.  I explained to him the benefits of treating sleep apnea which include better sleep, cardiovascular risk reduction, and ultimately better health profile.  I explained to him the insidious nature of sleep apnea and its effects on cardiovascular health.  He needs to contact his primary care physician to set this up.  I will see him as scheduled in a year with follow-up imaging.

## 2025-06-02 ENCOUNTER — APPOINTMENT (OUTPATIENT)
Dept: NEUROLOGY | Facility: CLINIC | Age: 80
End: 2025-06-02
Payer: MEDICARE

## 2025-06-17 PROBLEM — D70.8 CHRONIC BENIGN NEUTROPENIA: Status: ACTIVE | Noted: 2025-06-17

## 2025-06-27 DIAGNOSIS — Z00.6 CLINICAL TRIAL PARTICIPANT: Primary | ICD-10-CM

## 2025-07-02 DIAGNOSIS — Z00.6 RESEARCH EXAM: ICD-10-CM

## 2025-07-02 DIAGNOSIS — Z00.6 RESEARCH STUDY PATIENT: Primary | ICD-10-CM

## 2025-07-08 ENCOUNTER — OFFICE VISIT (OUTPATIENT)
Dept: NEUROLOGY | Facility: CLINIC | Age: 80
End: 2025-07-08
Payer: MEDICARE

## 2025-07-08 DIAGNOSIS — Z00.6 RESEARCH EXAM: ICD-10-CM

## 2025-07-08 PROCEDURE — 99213 OFFICE O/P EST LOW 20 MIN: CPT | Performed by: PSYCHIATRY & NEUROLOGY

## 2025-07-30 DIAGNOSIS — Z00.6 RESEARCH STUDY PATIENT: Primary | ICD-10-CM

## 2025-07-30 DIAGNOSIS — Z00.6 RESEARCH EXAM: ICD-10-CM

## 2025-08-06 ENCOUNTER — APPOINTMENT (OUTPATIENT)
Dept: RADIOLOGY | Facility: CLINIC | Age: 80
End: 2025-08-06
Payer: MEDICARE

## 2025-08-07 ENCOUNTER — HOSPITAL ENCOUNTER (OUTPATIENT)
Dept: RADIOLOGY | Facility: HOSPITAL | Age: 80
Discharge: HOME | End: 2025-08-07
Payer: MEDICARE

## 2025-08-07 DIAGNOSIS — Z00.6 CLINICAL TRIAL PARTICIPANT: ICD-10-CM

## 2025-08-12 ENCOUNTER — HOSPITAL ENCOUNTER (OUTPATIENT)
Dept: RADIOLOGY | Facility: HOSPITAL | Age: 80
Discharge: HOME | End: 2025-08-12
Payer: MEDICARE

## 2025-08-12 PROCEDURE — 70551 MRI BRAIN STEM W/O DYE: CPT

## 2025-08-12 PROCEDURE — 70551 MRI BRAIN STEM W/O DYE: CPT | Performed by: RADIOLOGY

## 2025-08-15 DIAGNOSIS — Z00.6 RESEARCH STUDY PATIENT: Primary | ICD-10-CM

## 2025-08-20 ENCOUNTER — HOSPITAL ENCOUNTER (OUTPATIENT)
Dept: RADIOLOGY | Facility: HOSPITAL | Age: 80
Discharge: HOME | End: 2025-08-20
Payer: MEDICARE

## 2025-08-20 DIAGNOSIS — Z00.6 RESEARCH EXAM: ICD-10-CM

## 2025-08-20 DIAGNOSIS — Z00.6 RESEARCH STUDY PATIENT: ICD-10-CM

## 2025-08-20 PROCEDURE — 3430000001 HC RX 343 DIAGNOSTIC RADIOPHARMACEUTICALS: Mod: TB | Performed by: PSYCHIATRY & NEUROLOGY

## 2025-08-20 PROCEDURE — A9586 FLORBETAPIR F18: HCPCS | Mod: TB | Performed by: PSYCHIATRY & NEUROLOGY

## 2025-08-20 PROCEDURE — 78814 PET IMAGE W/CT LMTD: CPT

## 2025-08-20 RX ADMIN — FLORBETAPIR F 18 8.7 MILLICURIE: 51 INJECTION, SOLUTION INTRAVENOUS at 13:20

## 2025-08-25 DIAGNOSIS — Z00.6 CLINICAL TRIAL PARTICIPANT: Primary | ICD-10-CM

## 2025-08-26 ENCOUNTER — APPOINTMENT (OUTPATIENT)
Dept: NEUROLOGY | Facility: CLINIC | Age: 80
End: 2025-08-26
Payer: MEDICARE

## 2025-08-27 ENCOUNTER — APPOINTMENT (OUTPATIENT)
Dept: RADIOLOGY | Facility: CLINIC | Age: 80
End: 2025-08-27
Payer: MEDICARE

## 2025-09-29 ENCOUNTER — APPOINTMENT (OUTPATIENT)
Dept: PRIMARY CARE | Facility: CLINIC | Age: 80
End: 2025-09-29
Payer: MEDICARE

## 2026-03-03 ENCOUNTER — APPOINTMENT (OUTPATIENT)
Dept: VASCULAR MEDICINE | Facility: CLINIC | Age: 81
End: 2026-03-03
Payer: MEDICARE

## 2026-03-25 ENCOUNTER — APPOINTMENT (OUTPATIENT)
Dept: VASCULAR MEDICINE | Facility: CLINIC | Age: 81
End: 2026-03-25
Payer: MEDICARE